# Patient Record
Sex: FEMALE | Race: WHITE | NOT HISPANIC OR LATINO | Employment: OTHER | URBAN - METROPOLITAN AREA
[De-identification: names, ages, dates, MRNs, and addresses within clinical notes are randomized per-mention and may not be internally consistent; named-entity substitution may affect disease eponyms.]

---

## 2017-01-13 ENCOUNTER — HOSPITAL ENCOUNTER (OUTPATIENT)
Dept: RADIOLOGY | Facility: HOSPITAL | Age: 52
Discharge: HOME | End: 2017-01-13
Attending: INTERNAL MEDICINE
Payer: MEDICAID

## 2017-01-13 DIAGNOSIS — E04.1 THYROID NODULE: ICD-10-CM

## 2017-01-13 PROCEDURE — 76536 US EXAM OF HEAD AND NECK: CPT | Mod: 26,,, | Performed by: RADIOLOGY

## 2017-01-13 PROCEDURE — 76536 US EXAM OF HEAD AND NECK: CPT | Mod: TC

## 2017-06-14 ENCOUNTER — OFFICE VISIT (OUTPATIENT)
Dept: NEUROLOGY | Facility: HOSPITAL | Age: 52
End: 2017-06-14
Attending: INTERNAL MEDICINE
Payer: MEDICAID

## 2017-06-14 VITALS — WEIGHT: 157 LBS | BODY MASS INDEX: 27.82 KG/M2 | HEIGHT: 63 IN

## 2017-06-14 DIAGNOSIS — Z80.0 FAMILY HISTORY OF COLON CANCER: Primary | ICD-10-CM

## 2017-06-14 PROCEDURE — 99213 OFFICE O/P EST LOW 20 MIN: CPT | Performed by: INTERNAL MEDICINE

## 2017-06-14 RX ORDER — POLYETHYLENE GLYCOL 3350, SODIUM SULFATE ANHYDROUS, SODIUM BICARBONATE, SODIUM CHLORIDE, POTASSIUM CHLORIDE 236; 22.74; 6.74; 5.86; 2.97 G/4L; G/4L; G/4L; G/4L; G/4L
4 POWDER, FOR SOLUTION ORAL ONCE
Qty: 4000 ML | Refills: 0 | Status: SHIPPED | OUTPATIENT
Start: 2017-06-14 | End: 2017-06-14

## 2017-06-14 NOTE — PATIENT INSTRUCTIONS
Nulytely Colonoscopy Prep Instructions    [facility] [place]   [address], [place] [address]  [phone]      PATIENT NAME:    Ronda Mello             PROCEDURE DAY: Thursday, July 20, 2017    *Your procedure time many change.  The hospital will contact you the day prior to   the procedure to confirm your procedure time and arrival.       CLEAR LIQUID DIET (START THE DAY BEFORE PROCEDURE): Wednesday, July 19, 2017      Clear Liquid Diet means any liquid from the list below that is not red or purple in color:   Gatorade, Hernan-Aid, Lemonade (Yellow ONLY)-Gatorade is the preferred liquid   Tea (no milk or dairy)   Carbonated beverages (soft drink), regular or diet   Apple juice, white grape juice, white cranberry juice   Jell-O (orange, lemon, or lime flavors ONLY)   Clear, fat-free, beef or chicken broth   Bouillon, clear consommé   Snowball, Popsicles (NOT red or purple)  * No Solid Food or Alcohol     ITEMS TO BE PURCHASED FOR PREP (Nu-Lytely requires a prescription):         Nu-Lytely preparation solution.          Gas tablets (Gas-X, Mylanta Gas, Simethicone)    BOWEL PREP INSTRUCTIONS THE DAY BEFORE THE EXAM: Wednesday, July 19, 2017    1. Drink only clear liquids (see the above diet) all day. Gatorade is the best liquid.   Drink an extra 8 ounces of clear liquid every hour from 11am to 5pm.         2.   At 6pm, mix Nu-Lytely powder according to the directions on the container and               drink 8 ounces of solution every 10 minutes until about half of the solution is                consumed. Place the remainder of the solution in the refrigerator.         3.   At 9pm, take two gas tablets with 8 ounces of clear liquid.        4.   At 10pm, take two gas tablets with 8 ounces of clear liquid.      THE DAY OF THE EXAM: Thursday, July 20, 2017        1.  Beginning 5 hours before your procedure time, drink the remaining half of              Nu-Lytely solution. Drink 8 ounces  of solution every 10 minutes until the solution              is gone.              *If your procedure is scheduled for the early morning, you will need to get up in               the middle of the night to take this dose of preparation. The correct timing of this               dose is essential to an effective preparation. If you do not take this dose, your               exam may be incomplete and need to be repeated.         2.  Have nothing to eat or drink for 3 hours before the procedure.         3.  Take your morning medications, if any, with a small sip of water.         4.  Bring someone to drive you home (you should not drive for 12 hrs after the exam)        5.  Report to Admitting, 1st floor hospital entrance 2 hours before procedure time.       If you are diabetic, do not take insulin or oral medications the morning of the procedure. Take only a half dose of insulin the day before your procedure. Do not take your diabetic pills the day of your procedure               Colonoscopy is used to view the inside of your lower digestive tract (colon and rectum). It can help screen for colon cancer and can also help find the source of abdominal pain, bleeding, and changes in bowel habits. The test is usually done in the hospital on an outpatient basis. During the exam, the doctor can remove a small tissue sample ( a biopsy) for testing. Small growths, such as polyps, may also be removed during colonoscopy.     A camera attached to a flexible tube with a viewing lens is used to take video pictures.    Getting Ready    Be sure to tell your doctor about any medications you take. Also tell your doctor about any health conditions you may have.   Discuss the risks of the test with your doctor. These include bleeding and bowel puncture.   Your rectum and colon must be empty for the test. So be sure to follow the diet and bowel prep instructions exactly. If you dont, the test may need to be rescheduled.   You will  need to have a friend or family member prepared to drive you home after the test.     Colonoscopy provides an inside view of the entire colon.    During the Test    You are given sedating (relaxing) medication through an IV line. You may be drowsy or completely asleep.   The procedure takes 30 minutes or longer.   The doctor performs a digital rectal exam to check for anal and rectal problems. The rectum is lubricated and the scope inserted.   If you are awake, you may have a feeling similar to needing to have a bowel movement. You may also feel pressure as air is pumped into the colon. Its okay to pass gas during the procedure.  After the Test    You may discuss the results with your doctor right away or at a future visit.   Try to pass all the gas right after the test to help prevent bloating and cramping.   After the test, you can go back to your normal eating and other activities.  Risks and Possible Complications Include:   Bleeding  A puncture or tear in the colon  Risks of anesthesia

## 2017-06-14 NOTE — PROGRESS NOTES
"[facility]     CC: Hepatitis C     HPI 51 y.o. female here for chronic, untreated, asymptomatic Hepatitis C associated with former IV drug use.  She also has a history of duodenal carcinoma s/p Whipple procedure, family history of colon cancer (reportedly her mother has Rebollar syndrome), uterine cancer s/p radiation and ANJANA, schizophrenia, and long history of alcohol abuse. Since seeing me last she reports she's feeling at her baseline but is frustrated by lack of insurance coverage for HCV therapy.       Medical history  Uterine cancer s/p radiation therapy  Duodenal carcinoma s/p Whipple  Chronic HCV, former IVDU  Schizophrenia       Review of Systems  General ROS: negative for chills, fever or weight loss  Cardiovascular ROS: no chest pain or dyspnea on exertion  Gastrointestinal ROS: no abdominal pain, change in bowel habits, or black/ bloody stools     Physical Examination  Ht 5' 3" (1.6 m)   Wt 71.2 kg (157 lb)   BMI 27.81 kg/m²   General appearance: alert, cooperative, no distress  HENT: Normocephalic, atraumatic, neck symmetrical, no nasal discharge   Lungs: clear to auscultation bilaterally, no dullness to percussion bilaterally  Heart: regular rate and rhythm without rub; no displacement of the PMI   Abdomen: soft, non-tender; bowel sounds normoactive; no organomegaly  Extremities: extremities symmetric; no clubbing, cyanosis, or edema  Neurologic: Alert and oriented X 3, normal strength, normal coordination and gait     Labs:  Hep C 3/2016  -Fibrosure, F0, A0  -GT 1a  -VL: 1.3 million copies     Assessment:   49 yo female with family history of colon cancer in her mother (age 60 who also has a history of Rebollar syndrome), h/o duodenal adenocarcinoma s/p Whipple, Hepatitis C without biochemical evidence of cirrhosis, here for evaluation of Hepatitis C treatment and liver disease.     Plan:  -Her insurance has declined to cover HCV therapy at this time  -For family history of Rebollar syndrome and " personal history of duodenal adenocarcinoma, will plan for repeat colonoscopy with close inspection then repeat in 2 years     *use a pediatric colonoscope for exam*      Abhijeet Dickey MD   [address], [address]   [place] [address] [phone]

## 2017-06-16 ENCOUNTER — TELEPHONE (OUTPATIENT)
Dept: NEUROLOGY | Facility: HOSPITAL | Age: 52
End: 2017-06-16

## 2017-06-16 NOTE — TELEPHONE ENCOUNTER
No Authorization is needed for cpt 33981  Per Eulalia FIORE  Reference # [id number] Eulalia FIORE 2:58

## 2017-07-20 ENCOUNTER — HOSPITAL ENCOUNTER (OUTPATIENT)
Facility: HOSPITAL | Age: 52
Discharge: HOME | End: 2017-07-20
Attending: INTERNAL MEDICINE | Admitting: INTERNAL MEDICINE
Payer: MEDICAID

## 2017-07-20 ENCOUNTER — ANESTHESIA EVENT (OUTPATIENT)
Dept: ENDOSCOPY | Facility: HOSPITAL | Age: 52
End: 2017-07-20
Payer: MEDICAID

## 2017-07-20 ENCOUNTER — ANESTHESIA (OUTPATIENT)
Dept: ENDOSCOPY | Facility: HOSPITAL | Age: 52
End: 2017-07-20
Payer: MEDICAID

## 2017-07-20 DIAGNOSIS — Z15.09 LYNCH SYNDROME: ICD-10-CM

## 2017-07-20 DIAGNOSIS — Z12.11 SCREENING FOR COLON CANCER: Primary | ICD-10-CM

## 2017-07-20 DIAGNOSIS — K63.5 POLYP OF COLON, UNSPECIFIED PART OF COLON, UNSPECIFIED TYPE: ICD-10-CM

## 2017-07-20 PROCEDURE — 45385 COLONOSCOPY W/LESION REMOVAL: CPT | Performed by: INTERNAL MEDICINE

## 2017-07-20 PROCEDURE — 88305 TISSUE EXAM BY PATHOLOGIST: CPT | Mod: 26,,, | Performed by: PATHOLOGY

## 2017-07-20 PROCEDURE — 63600175 PHARM REV CODE 636 W HCPCS: Performed by: NURSE ANESTHETIST, CERTIFIED REGISTERED

## 2017-07-20 PROCEDURE — 45381 COLONOSCOPY SUBMUCOUS NJX: CPT | Performed by: INTERNAL MEDICINE

## 2017-07-20 PROCEDURE — 25000003 PHARM REV CODE 250: Performed by: NURSE ANESTHETIST, CERTIFIED REGISTERED

## 2017-07-20 PROCEDURE — 27201028 HC NEEDLE, SCLERO: Performed by: INTERNAL MEDICINE

## 2017-07-20 PROCEDURE — 88305 TISSUE EXAM BY PATHOLOGIST: CPT | Performed by: PATHOLOGY

## 2017-07-20 PROCEDURE — 37000008 HC ANESTHESIA 1ST 15 MINUTES: Performed by: INTERNAL MEDICINE

## 2017-07-20 PROCEDURE — 27201089 HC SNARE, DISP (ANY): Performed by: INTERNAL MEDICINE

## 2017-07-20 PROCEDURE — 25000003 PHARM REV CODE 250: Performed by: INTERNAL MEDICINE

## 2017-07-20 PROCEDURE — 37000009 HC ANESTHESIA EA ADD 15 MINS: Performed by: INTERNAL MEDICINE

## 2017-07-20 RX ORDER — LIDOCAINE HCL/PF 100 MG/5ML
SYRINGE (ML) INTRAVENOUS
Status: DISCONTINUED | OUTPATIENT
Start: 2017-07-20 | End: 2017-07-20

## 2017-07-20 RX ORDER — PROPOFOL 10 MG/ML
VIAL (ML) INTRAVENOUS CONTINUOUS PRN
Status: DISCONTINUED | OUTPATIENT
Start: 2017-07-20 | End: 2017-07-20

## 2017-07-20 RX ORDER — PROPOFOL 10 MG/ML
VIAL (ML) INTRAVENOUS
Status: DISCONTINUED | OUTPATIENT
Start: 2017-07-20 | End: 2017-07-20

## 2017-07-20 RX ORDER — SODIUM CHLORIDE 9 MG/ML
INJECTION, SOLUTION INTRAVENOUS CONTINUOUS
Status: DISCONTINUED | OUTPATIENT
Start: 2017-07-20 | End: 2017-07-20 | Stop reason: HOSPADM

## 2017-07-20 RX ADMIN — SODIUM CHLORIDE: 0.9 INJECTION, SOLUTION INTRAVENOUS at 10:07

## 2017-07-20 RX ADMIN — LIDOCAINE HYDROCHLORIDE 40 MG: 20 INJECTION, SOLUTION INTRAVENOUS at 11:07

## 2017-07-20 RX ADMIN — PROPOFOL 50 MG: 10 INJECTION, EMULSION INTRAVENOUS at 11:07

## 2017-07-20 RX ADMIN — PROPOFOL 150 MCG/KG/MIN: 10 INJECTION, EMULSION INTRAVENOUS at 11:07

## 2017-07-20 NOTE — H&P
[facility]     CC: Hepatitis C     HPI 51 y.o. female here for chronic, untreated, asymptomatic Hepatitis C associated with former IV drug use.  She also has a history of duodenal carcinoma s/p Whipple procedure, family history of colon cancer (reportedly her mother has Rebollar syndrome), uterine cancer s/p radiation and ANJANA, schizophrenia, and long history of alcohol abuse.   Since seeing me last she reports she's feeling at her baseline but is frustrated by lack of insurance coverage for HCV therapy.       Medical history  Uterine cancer s/p radiation therapy  Duodenal carcinoma s/p Whipple  Chronic HCV, former IVDU  Schizophrenia       Review of Systems  General ROS: negative for chills, fever or weight loss  Cardiovascular ROS: no chest pain or dyspnea on exertion  Gastrointestinal ROS: no abdominal pain, change in bowel habits, or black/ bloody stools     Physical Examination  Vitals reviewed  General appearance: alert, cooperative, no distress  HENT: Normocephalic, atraumatic, neck symmetrical, no nasal discharge   Lungs: clear to auscultation bilaterally, no dullness to percussion bilaterally  Heart: regular rate and rhythm without rub; no displacement of the PMI   Abdomen: soft, non-tender; bowel sounds normoactive; no organomegaly  Extremities: extremities symmetric; no clubbing, cyanosis, or edema  Neurologic: Alert and oriented X 3, normal strength, normal coordination and gait     Labs:  Hep C 3/2016  -Fibrosure, F0, A0  -GT 1a  -VL: 1.3 million copies    Lab Results   Component Value Date    WBC 13.76 (H) 07/12/2016    HGB 12.7 07/12/2016    HCT 38.1 07/12/2016    MCV 89 07/12/2016     07/12/2016          Assessment:   51 yo female with family history of colon cancer in her mother (age 60 who also has a history of Rebollar syndrome), h/o duodenal adenocarcinoma s/p Whipple, Hepatitis C without biochemical evidence of cirrhosis, here for colonoscopy for family history of Rebollar syndrome and  personal history of duodenal AC.     Plan:  -For family history of Rebollar syndrome and personal history of duodenal adenocarcinoma, colonoscopy today with close inspection then repeat in 2 years      *use a pediatric colonoscope for exam*      Abhijeet Dickey MD   [address], [address]   [place] [address] [phone]

## 2017-07-20 NOTE — ANESTHESIA POSTPROCEDURE EVALUATION
"Anesthesia Post Evaluation    Patient: Ronda Mello    Procedure(s) Performed: Procedure(s) (LRB):  COLONOSCOPY (N/A)    Final Anesthesia Type: MAC  Patient location during evaluation: GI PACU  Patient participation: Yes- Able to Participate  Level of consciousness: awake and alert  Post-procedure vital signs: reviewed and stable  Pain management: adequate  Airway patency: patent  PONV status at discharge: No PONV  Anesthetic complications: no      Cardiovascular status: hemodynamically stable  Respiratory status: unassisted, spontaneous ventilation and room air  Hydration status: euvolemic  Follow-up not needed.        Visit Vitals  BP (!) 163/86 (BP Method: Automatic)   Pulse 76   Temp 36.8 °C (98.2 °F) (Oral)   Resp 20   Ht 5' 3" (1.6 m)   Wt 72.1 kg (159 lb)   SpO2 98%   Breastfeeding? No   BMI 28.17 kg/m²       Pain/Brandie Score: Pain Assessment Performed: Yes (7/20/2017 10:39 AM)  Presence of Pain: denies (7/20/2017 10:39 AM)      "

## 2017-07-20 NOTE — TRANSFER OF CARE
"Anesthesia Transfer of Care Note    Patient: Ronda Mello    Procedure(s) Performed: Procedure(s) (LRB):  COLONOSCOPY (N/A)    Patient location: GI    Anesthesia Type: MAC    Transport from OR: Transported from OR on room air with adequate spontaneous ventilation    Post pain: adequate analgesia    Post assessment: no apparent anesthetic complications    Post vital signs: stable    Level of consciousness: awake and alert    Nausea/Vomiting: no nausea/vomiting    Complications: none    Transfer of care protocol was followed      Last vitals:   Visit Vitals  BP (!) 163/86 (BP Method: Automatic)   Pulse 76   Temp 36.8 °C (98.2 °F) (Oral)   Resp 20   Ht 5' 3" (1.6 m)   Wt 72.1 kg (159 lb)   SpO2 98%   Breastfeeding? No   BMI 28.17 kg/m²     "

## 2017-07-20 NOTE — DISCHARGE INSTRUCTIONS
Discharge Summary/Instructions for after Colonoscopy with Biopsy/Polypectomy    Ronda Mello  7/20/2017  Abhijeet Dickey MD    Restrictions on Activity:    - Do not drive car or operate machinery until the day after the procedure.  - The following day: return to full activity including work.  - For 3 days: No heavy lifting, straining or running.  - Diet: Eat and drink normally unless instructed otherwise.    Treatment for Common Side Effects:  - Mild abdominal pain and bloating or excessive gas: rest, eat lightly and use a heating pad.     Symptoms to watch for and report to your physician:  1. Severe abdominal pain.  2. Fever within 24 hours after a procedure.  3. A large amount of rectal bleeding. (A small amount of blood from the rectum is not serious, especially if hemorrhoids are present.)  4. Because air was put into your colon during the procedure, expelling large amount of air from your rectum is normal.  5. You may not have a bowel movement for 1-3 days because of the colonoscopy prep. This is normal.  6. Go directly to the emergency room if you notice any of the following:     Chills and/or fever over 101   Persistent vomiting   Severe abdominal pain, other than gas cramps   Severe chest pain   Black, tarry stools   Any bleeding - exceeding one tablespoon    If you have any questions or problems, please call your Physician:    Abhijeet Dickey MD      Lab Results: Contact Physician's Office      If a complication or emergency situation arises and you are unable to reach your Physician - GO TO THE EMERGENCY ROOM.

## 2017-07-20 NOTE — ANESTHESIA PREPROCEDURE EVALUATION
07/20/2017  Ronda Mello is a 52 y.o., female w hx colon CA for colonoscopy.    PRIOR ANES (in Epic)   2015-10-15 Colonoscopy MAC prop boluses (140 total)    infusion 150 mcg/kg/min VSS [facility]    ANES-RELATED MED/SURG  Smoker  Past Surgical History:   Procedure Laterality Date    CHOLECYSTECTOMY      COLONOSCOPY N/A 10/15/2015    Procedure: COLONOSCOPY;  Surgeon: Abhijeet Dickey MD;  Location: [facility];  Service: Endoscopy;  Laterality: N/A;    HYSTERECTOMY      total w/ removal of both ovaries    UMBILICAL HERNIA REPAIR       ALLERGIES  Review of patient's allergies indicates:   Allergen Reactions    Norco [hydrocodone-acetaminophen] Hives     ANES-RELATED HOME Rx  Advair    Anesthesia Evaluation         Review of Systems  Anesthesia Hx:  Denies Hx of Anesthetic complications History of prior surgery of interest to airway management or planning:  Denies Personal Hx of Anesthesia complications.   Social:  Smoker, Social Alcohol Use    Hematology/Oncology:  Hematology Normal   Oncology Normal     EENT/Dental:   Missing teeth  No loose teeth   Cardiovascular:  Cardiovascular Normal  Denies Hypertension.     Pulmonary:   COPD Denies Sleep Apnea.    Renal/:  Renal/ Normal     Hepatic/GI:   Hx colon CA   Musculoskeletal:   Arthritis     Neurological:  Neurology Normal    Endocrine:  Endocrine Normal      Wt Readings from Last 1 Encounters:   07/20/17 72.1 kg (159 lb)     Temp Readings from Last 1 Encounters:   07/20/17 36.8 °C (98.2 °F) (Oral)     BP Readings from Last 1 Encounters:   07/20/17 (!) 163/86     Pulse Readings from Last 1 Encounters:   07/20/17 76     SpO2 Readings from Last 1 Encounters:   07/20/17 98%       Physical Exam  General:  Well nourished    Airway/Jaw/Neck:  AIRWAY FINDINGS: Normal       Dental:  Dental Findings:    Chest/Lungs:  Chest/Lungs Findings: Rhonchi         Mental  Status:  Mental Status Findings: Normal       Lab Results   Component Value Date    WBC 13.76 (H) 07/12/2016    HGB 12.7 07/12/2016    HCT 38.1 07/12/2016    MCV 89 07/12/2016     07/12/2016       Chemistry        Component Value Date/Time     07/12/2016 1612    K 3.6 07/12/2016 1612     07/12/2016 1612    CO2 23 07/12/2016 1612    BUN 11 07/12/2016 1612    CREATININE 0.5 07/12/2016 1612     07/12/2016 1612        Component Value Date/Time    CALCIUM 8.4 (L) 07/12/2016 1612    ALKPHOS 57 07/12/2016 1612    AST 21 07/12/2016 1612    ALT 21 07/12/2016 1612    BILITOT 0.8 07/12/2016 1612    ESTGFRAFRICA >60 07/12/2016 1612    EGFRNONAA >60 07/12/2016 1612          Lab Results   Component Value Date    ALBUMIN 2.9 (L) 07/12/2016     Lab Results   Component Value Date    TSH <0.010 (L) 01/30/2016       CXR 2016-03-04  The lungs are clear of any focal opacity.  There is no pneumothorax or pleural fluid.  The cardiac silhouette is not enlarged.  The osseous structures are significant for a remote fracture deformity of the left sixth rib.    EKG 2016-01-30  Normal sinus rhythm  Possible Biatrial abnormality  No previous ECGs available  Confirmed by RICH    ECHO      Anesthesia Plan  Type of Anesthesia, risks & benefits discussed:  Anesthesia Type:  MAC  Patient's Preference:   Intra-op Monitoring Plan:   Intra-op Monitoring Plan Comments:   Post Op Pain Control Plan:   Post Op Pain Control Plan Comments:   Induction:    Beta Blocker:  Patient is not currently on a Beta-Blocker (No further documentation required).       Informed Consent: Patient understands risks and agrees with Anesthesia plan.  Questions answered. Anesthesia consent signed with patient.  ASA Score: 2     Day of Surgery Review of History & Physical:            Ready For Surgery From Anesthesia Perspective.

## 2017-07-21 VITALS
WEIGHT: 159 LBS | BODY MASS INDEX: 28.17 KG/M2 | OXYGEN SATURATION: 100 % | DIASTOLIC BLOOD PRESSURE: 99 MMHG | HEART RATE: 86 BPM | HEIGHT: 63 IN | SYSTOLIC BLOOD PRESSURE: 163 MMHG | RESPIRATION RATE: 15 BRPM | TEMPERATURE: 98 F

## 2017-07-31 ENCOUNTER — TELEPHONE (OUTPATIENT)
Dept: NEUROLOGY | Facility: HOSPITAL | Age: 52
End: 2017-07-31

## 2017-07-31 NOTE — TELEPHONE ENCOUNTER
Patient informed of pathology with plan to repeat colonoscopy in two years given history of Rebollar syndrome.

## 2017-08-04 ENCOUNTER — HOSPITAL ENCOUNTER (EMERGENCY)
Facility: HOSPITAL | Age: 52
Discharge: HOME | End: 2017-08-05
Attending: EMERGENCY MEDICINE
Payer: MEDICAID

## 2017-08-04 DIAGNOSIS — F10.10 ALCOHOL ABUSE: Primary | ICD-10-CM

## 2017-08-04 PROCEDURE — 96374 THER/PROPH/DIAG INJ IV PUSH: CPT

## 2017-08-04 PROCEDURE — 99284 EMERGENCY DEPT VISIT MOD MDM: CPT | Mod: 25

## 2017-08-04 PROCEDURE — 96375 TX/PRO/DX INJ NEW DRUG ADDON: CPT

## 2017-08-04 PROCEDURE — 99285 EMERGENCY DEPT VISIT HI MDM: CPT | Mod: ,,, | Performed by: EMERGENCY MEDICINE

## 2017-08-04 PROCEDURE — 96372 THER/PROPH/DIAG INJ SC/IM: CPT

## 2017-08-05 VITALS
OXYGEN SATURATION: 95 % | TEMPERATURE: 100 F | HEART RATE: 79 BPM | DIASTOLIC BLOOD PRESSURE: 75 MMHG | RESPIRATION RATE: 18 BRPM | WEIGHT: 159 LBS | HEIGHT: 63 IN | BODY MASS INDEX: 28.17 KG/M2 | SYSTOLIC BLOOD PRESSURE: 134 MMHG

## 2017-08-05 LAB — ETHANOL SERPL-MCNC: 291 MG/DL

## 2017-08-05 PROCEDURE — 25000003 PHARM REV CODE 250: Performed by: EMERGENCY MEDICINE

## 2017-08-05 PROCEDURE — 93010 ELECTROCARDIOGRAM REPORT: CPT | Mod: ,,, | Performed by: INTERNAL MEDICINE

## 2017-08-05 PROCEDURE — 63600175 PHARM REV CODE 636 W HCPCS: Performed by: EMERGENCY MEDICINE

## 2017-08-05 PROCEDURE — 80320 DRUG SCREEN QUANTALCOHOLS: CPT

## 2017-08-05 RX ORDER — DIPHENHYDRAMINE HYDROCHLORIDE 50 MG/ML
25 INJECTION INTRAMUSCULAR; INTRAVENOUS
Status: COMPLETED | OUTPATIENT
Start: 2017-08-05 | End: 2017-08-05

## 2017-08-05 RX ORDER — HALOPERIDOL 5 MG/ML
5 INJECTION INTRAMUSCULAR
Status: COMPLETED | OUTPATIENT
Start: 2017-08-05 | End: 2017-08-05

## 2017-08-05 RX ORDER — LORAZEPAM 2 MG/ML
2 INJECTION INTRAMUSCULAR
Status: COMPLETED | OUTPATIENT
Start: 2017-08-05 | End: 2017-08-05

## 2017-08-05 RX ADMIN — DIPHENHYDRAMINE HYDROCHLORIDE 25 MG: 50 INJECTION INTRAMUSCULAR; INTRAVENOUS at 12:08

## 2017-08-05 RX ADMIN — HALOPERIDOL LACTATE 5 MG: 5 INJECTION, SOLUTION INTRAMUSCULAR at 12:08

## 2017-08-05 RX ADMIN — FOLIC ACID: 5 INJECTION, SOLUTION INTRAMUSCULAR; INTRAVENOUS; SUBCUTANEOUS at 12:08

## 2017-08-05 RX ADMIN — LORAZEPAM 2 MG: 2 INJECTION INTRAMUSCULAR; INTRAVENOUS at 12:08

## 2017-08-05 NOTE — ED NOTES
Pt is resting comfortably in stretcher with eyes closed. Respirations are full, even, and non labored. NAD noted. Pt denies pain at this time. Call bell is in reach, side rails are up x 2, and bed is in lowest, locked, position. Will continue to monitor.

## 2017-08-05 NOTE — ED NOTES
Patient identifiers verified and correct for Ms Mello  C/C: Alcohol intoxication  APPEARANCE: awake and alert in NAD.  SKIN: warm, dry and intact. No breakdown or bruising.  MUSCULOSKELETAL: Patient moving all extremities spontaneously, no obvious swelling or deformities noted. Ambulates independently.  RESPIRATORY: Denies shortness of breath.Respirations unlabored.   CARDIAC: Denies CP, 2+ distal pulses; no peripheral edema  ABDOMEN: S/ND/NT, Denies nausea  : voids spontaneously, denies difficulty  Neurologic: AAO x 4; follows commands equal strength in all extremities; denies numbness/tingling. Denies dizziness

## 2017-08-05 NOTE — ED NOTES
"Care assumed, pericare and linen changed for loose BM. Patient awake, alert, states she will take the bus home, states she drank last night "first time in 7 months" as last night was anniversary of Mothers death.   "

## 2017-08-05 NOTE — ED NOTES
Spoke with CN and physician, called [phone], patient states address [address]

## 2017-08-05 NOTE — ED TRIAGE NOTES
According to EMS, pt was found passed out intoxicated on the street. Police did not take her to long-term because they didn't want to deal with her. Patient is loud, screaming obscenities, smells of ETOH and appears intoxicated

## 2017-08-05 NOTE — ED NOTES
Pt awakens to verbal stimuli Resp full and reg Radial pulses strong and reg Oriented to person,time and people Reoriented to place

## 2017-08-05 NOTE — ED NOTES
Pt is resting comfortably on a stretcher. NAD noted, VSS. BP cuff and pulse ox alarms are set. Bed low and locked, side rails up x2. Call light within reach of pt. Will continue to monitor.

## 2017-08-05 NOTE — ED NOTES
Pt is AAOx3. Pt is comfortable. Behavior is loud and inappropriate. Patient smells of ETOH and appears intoxicated. Skin is warm, dry, and intact. Mucous membranes are pink and moist. Airway is patent.  Respirations are full, even, and non labored. Breath sounds are present and clear throughout all lung fields.  Abdomen is soft, flat, non distended, and non tender x 4 quads. Normoactive bowel sounds present x 4 quadrants. Pulses are palpable in bilateral radial arteries. Capillary refill is brisk and  < 3 seconds in bilateral fingers. S1, S2 heart tones are present. No neuro deficits noted. Pt's identity confirmed and armband applied. Pt updated on plan of care. Pt awaiting ED physician. No needs verbalized. Pain at this time is 0/10.  Will continue to monitor.

## 2017-08-05 NOTE — ED NOTES
Pt resting quietly in stretcher with eyes closed, sitter at bedside. NAD noted. Respirations even, and nonlabored. Will continue to monitor. Side rails are up x 2 and stretcher is in lowest, locked position.

## 2017-08-05 NOTE — ED PROVIDER NOTES
Encounter Date: 8/4/2017    SCRIBE #1 NOTE: I, Raffaele Swann, am scribing for, and in the presence of,  Dr. Jimenez . I have scribed the entire note.       History     Chief Complaint   Patient presents with    Alcohol Intoxication     Found very drunk down the street.      Time patient was seen by the provider: 12:10 AM      Pt with a hx of Schizophrenia presents with EtOH intoxication and was brought into the ED by ambulance. She is belligerent, agitated, and threatening in the ambulance. Pt states she does not drink often but tonight she did. She has a drug allergy to Norco.         The history is provided by the patient and medical records.     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     Uterine cancer     adenocarcinoma ot uterus     Past Surgical History:   Procedure Laterality Date    CHOLECYSTECTOMY      COLONOSCOPY N/A 10/15/2015    Procedure: COLONOSCOPY;  Surgeon: Abhijeet Dickey MD;  Location: [facility] ENDO;  Service: Endoscopy;  Laterality: N/A;    COLONOSCOPY N/A 7/20/2017    Procedure: COLONOSCOPY;  Surgeon: Abhijeet Dickey MD;  Location: [facility] ENDO;  Service: Endoscopy;  Laterality: N/A;    HYSTERECTOMY      total w/ removal of both ovaries    UMBILICAL HERNIA REPAIR       Family History   Problem Relation Age of Onset    Cancer      Arthritis      Heart disease       Social History   Substance Use Topics    Smoking status: Current Every Day Smoker     Packs/day: 0.25     Years:  "37.00     Types: Cigarettes     Last attempt to quit: 12/9/2015    Smokeless tobacco: Never Used    Alcohol use 0.0 oz/week      Comment: "i drink beer every other weekend but that's it"     Review of Systems   Unable to perform ROS: Mental status change       Physical Exam     Initial Vitals [08/04/17 2353]   BP Pulse Resp Temp SpO2   (!) 140/90 90 16 -- 100 %      MAP       106.67         Physical Exam    Nursing note and vitals reviewed.  Constitutional: She appears well-developed and well-nourished. She is not diaphoretic. No distress.   HENT:   Head: Normocephalic and atraumatic.   Mouth/Throat: Oropharynx is clear and moist.   Eyes: Conjunctivae and EOM are normal. Pupils are equal, round, and reactive to light.   Neck: Normal range of motion. Neck supple. No JVD present.   Cardiovascular: Normal rate, regular rhythm and normal heart sounds.   No murmur heard.  Pulmonary/Chest: Breath sounds normal. No respiratory distress. She has no wheezes. She has no rhonchi. She has no rales.   Abdominal: Soft. Bowel sounds are normal. She exhibits no mass. There is no tenderness. There is no rebound and no guarding.   Genitourinary:   Genitourinary Comments: Pelvic exam:  Slight amount of bleeding. Os closed. No tenderness.    Musculoskeletal: Normal range of motion. She exhibits no edema or tenderness.   Neurological: She is alert and oriented to person, place, and time. She has normal strength. No cranial nerve deficit or sensory deficit.   Neuro intact.    Skin: Skin is warm and dry. No rash noted.   Psychiatric:   Pt is cursing, violent, punching, and aggressive.          ED Course   Procedures  Labs Reviewed   ALCOHOL,MEDICAL (ETHANOL) - Abnormal; Notable for the following:        Result Value    Alcohol, Medical, Serum 291 (*)     All other components within normal limits     EKG Readings: (Independently Interpreted)   Sinus tachycardia at rate of 110 bpm. No ischemic changes.           Medical Decision Making: "   History:   Old Medical Records: I decided to obtain old medical records.  Initial Assessment:   Violent pt. Will sedate with Haldol and Ativan and observe closely.   Independently Interpreted Test(s):   I have ordered and independently interpreted EKG Reading(s) - see prior notes  Clinical Tests:   Lab Tests: Ordered and Reviewed  Medical Tests: Ordered and Reviewed            Scribe Attestation:   Scribe #1: I performed the above scribed service and the documentation accurately describes the services I performed. I attest to the accuracy of the note.    Attending Attestation:           Physician Attestation for Scribe:  Physician Attestation Statement for Scribe #1: I, Dr. Jimenez , reviewed documentation, as scribed by Raffaele Swann in my presence, and it is both accurate and complete.                 ED Course     Clinical Impression:   The encounter diagnosis was Alcohol abuse.    Disposition:   Disposition: Discharged  Condition: Stable                      Catrachito Jimenez MD  08/06/17 0532

## 2017-08-05 NOTE — ED NOTES
Update to Dr Trujillo, states he would like patient to go home via taxi, update to case management,

## 2017-08-05 NOTE — ED NOTES
Pt resting quietly in stretcher with eyes closed, sitter at bedside. NAD noted. Respirations even, and nonlabored. Will continue to monitor

## 2017-08-14 DIAGNOSIS — E05.90 HYPERTHYROIDISM, SUBCLINICAL: Primary | ICD-10-CM

## 2017-09-10 ENCOUNTER — HOSPITAL ENCOUNTER (EMERGENCY)
Facility: HOSPITAL | Age: 52
Discharge: HOME | End: 2017-09-10
Attending: EMERGENCY MEDICINE
Payer: MEDICAID

## 2017-09-10 VITALS
HEIGHT: 63 IN | BODY MASS INDEX: 28.7 KG/M2 | HEART RATE: 102 BPM | RESPIRATION RATE: 20 BRPM | TEMPERATURE: 99 F | DIASTOLIC BLOOD PRESSURE: 70 MMHG | WEIGHT: 162 LBS | SYSTOLIC BLOOD PRESSURE: 144 MMHG | OXYGEN SATURATION: 97 %
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
## 2017-09-10 DIAGNOSIS — R07.81 RIB PAIN ON LEFT SIDE: ICD-10-CM
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
## 2017-09-10 DIAGNOSIS — S22.32XA CLOSED FRACTURE OF ONE RIB OF LEFT SIDE, INITIAL ENCOUNTER: Primary | ICD-10-CM

## 2017-09-10 PROCEDURE — 25000003 PHARM REV CODE 250: Performed by: GENERAL PRACTICE

## 2017-09-10 PROCEDURE — 99284 EMERGENCY DEPT VISIT MOD MDM: CPT | Mod: ,,, | Performed by: EMERGENCY MEDICINE

## 2017-09-10 PROCEDURE — 99283 EMERGENCY DEPT VISIT LOW MDM: CPT

## 2017-09-10 RX ORDER — MELOXICAM 7.5 MG/1
7.5 TABLET ORAL DAILY
Qty: 14 TABLET | Refills: 0 | Status: SHIPPED | OUTPATIENT
Start: 2017-09-10 | End: 2019-04-01 | Stop reason: CLARIF

## 2017-09-10 RX ORDER — IBUPROFEN 400 MG/1
800 TABLET ORAL
Status: COMPLETED | OUTPATIENT
Start: 2017-09-10 | End: 2017-09-10

## 2017-09-10 RX ADMIN — IBUPROFEN 800 MG: 400 TABLET, FILM COATED ORAL at 02:09

## 2017-09-10 NOTE — ED NOTES
LOC: The patient is awake, alert and aware of environment, the patient is oriented x 3 with rambling, slurred speech due to alcohol use.  APPEARANCE: Patient resting comfortably and in no acute distress, patient is clean and well groomed, patient's clothing is properly fastened.  SKIN: The skin is warm and dry, patient has normal skin turgor and moist mucus membranes. Scabbed abrasions noted to bilateral knees  MUSKULOSKELETAL: Patient moving all extremities well, no obvious swelling or deformities noted.  RESPIRATORY: Airway is open and patent, respirations are spontaneous, patient has a normal effort and rate.   NEURO: No neuro deficits, speech is slurred due to alcohol use.

## 2017-09-10 NOTE — ED TRIAGE NOTES
Pt to ER with c/o left rib pain after a fall Wednesday night. Pt reports she tripped and fell. Denies head injury but reports she passed out after she fell. Pt reports she was drinking when she fell but denies intoxication. Pt reports it happened at this bus stop in front of the hospital but she felt she was too intoxicated to come in for treatment. Pt reports drinking two beers today, slurred speech noted.

## 2017-09-10 NOTE — ED PROVIDER NOTES
Encounter Date: 9/10/2017    SCRIBE #1 NOTE: I, Antoni Hicks, am scribing for, and in the presence of,  Dr. Rubin. I have scribed the following portions of the note - the Resident attestation.       History     Chief Complaint   Patient presents with    Rib Injury     pt states she fell a few days ago and hurt her left lower rib. pt admits to drinking alcohol before coming into ED     HPI  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 Pt states she fell while standing on a curb on Wednesday. Pt has multiple presentations with alcohol intoxication, states she was intoxicated when she fell. Pt did now have CW pain until two days ago and it has become worse. Pt denies FCS. No NVD. No BLE swelling. Pt is a daily drinker and states she had two beers today.     Past Surgical History:   Procedure Laterality Date    CHOLECYSTECTOMY      COLONOSCOPY N/A 10/15/2015    Procedure: COLONOSCOPY;  Surgeon: Abhijeet Dickey MD;  Location: [facility] ENDO;  Service: Endoscopy;  Laterality: N/A;    COLONOSCOPY N/A 7/20/2017    Procedure: COLONOSCOPY;  Surgeon: Abhijeet Dickey MD;  Location: [facility] ENDO;  Service: Endoscopy;  Laterality: N/A;    HYSTERECTOMY      total w/ removal of both ovaries    UMBILICAL HERNIA REPAIR       Family History   Problem Relation Age of Onset    Cancer       "Arthritis      Heart disease       Social History   Substance Use Topics    Smoking status: Current Every Day Smoker     Packs/day: 1.00     Years: 35.00     Types: Cigarettes     Last attempt to quit: 12/9/2015    Smokeless tobacco: Never Used    Alcohol use 0.0 oz/week      Comment: Admits to at least 2 beers daily but states "I am not an alcoholic"     Review of Systems   Constitutional: Negative for activity change, appetite change, chills, diaphoresis, fatigue and fever.   HENT: Negative for congestion, postnasal drip, rhinorrhea, sinus pain, sinus pressure, tinnitus and voice change.    Eyes: Negative for visual disturbance.   Respiratory: Negative for cough, choking, chest tightness, shortness of breath, wheezing and stridor.    Cardiovascular: Positive for chest pain. Negative for palpitations and leg swelling.   Gastrointestinal: Negative for abdominal pain, constipation, diarrhea, nausea and rectal pain.   Genitourinary: Negative for difficulty urinating and dysuria.   Musculoskeletal: Negative for back pain and gait problem.   Skin: Negative for color change, pallor, rash and wound.   Neurological: Negative for dizziness, tremors, seizures, syncope, facial asymmetry, speech difficulty, weakness, light-headedness, numbness and headaches.   Psychiatric/Behavioral: Negative for confusion.       Physical Exam     Initial Vitals [09/10/17 0117]   BP Pulse Resp Temp SpO2   (!) 144/70 102 20 98.7 °F (37.1 °C) 97 %      MAP       94.67         Physical Exam    Nursing note and vitals reviewed.  Constitutional: She is not intubated.   Pulmonary/Chest: No accessory muscle usage. No apnea, no tachypnea and no bradypnea. She is not intubated. No respiratory distress. Chest wall is not dull to percussion. She exhibits tenderness and bony tenderness. She exhibits no mass, no laceration, no crepitus, no edema, no deformity, no swelling and no retraction.       Neurological: She is oriented to person, place, and " time. No cranial nerve deficit or sensory deficit. Gait normal. GCS eye subscore is 4. GCS verbal subscore is 5. GCS motor subscore is 6.   Psychiatric: Her speech is delayed. Her speech is not slurred. She is slowed and withdrawn. She is not combative. Cognition and memory are not impaired. She expresses no homicidal and no suicidal ideation.         ED Course   Procedures  Labs Reviewed - No data to display          Medical Decision Making:   History:   Old Medical Records: I decided to obtain old medical records.  Initial Assessment:   51yo female with h/o alcohol abuse presents with left CW pain  Differential Diagnosis:   Sprain, strain, contusion, fx, less likely to be ACS, PNA  Clinical Tests:   Radiological Study: Ordered and Reviewed  ED Management:  Pt with left lateral 6th rib fx. Pt walking without difficulty, no nystagmus. Will discharge to home with NSAIDS.  Ignacio Cannon MD, MPH  Emergency Med/Pediatrics PGY-4  9/10/2017 2:59 AM              Scribe Attestation:   Scribe #1: I performed the above scribed service and the documentation accurately describes the services I performed. I attest to the accuracy of the note.    Attending Attestation:   Physician Attestation Statement for Resident:  As the supervising MD   Physician Attestation Statement: I have personally seen and examined this patient.   I agree with the above history. -:   As the supervising MD I agree with the above PE.    As the supervising MD I agree with the above treatment, course, plan, and disposition.   -: 52 y.o. female who fell on her left side a few days ago, complaining of worsening rib pain. X-ray shows healing fracture. Will discharge with non-narcotic pain medication.   I have reviewed and agree with the residents interpretation of the following: x-rays.  I have reviewed the following: old records at this facility.          Physician Attestation for Scribe:  Physician Attestation Statement for Scribe #1: I, Dr. Rubin, reviewed  documentation, as scribed by Antoni Hicks in my presence, and it is both accurate and complete.                 ED Course      Clinical Impression:   The primary encounter diagnosis was Closed fracture of one rib of left side, initial encounter. A diagnosis of Rib pain on left side was also pertinent to this visit.                           Jb Cannon MD  Resident  09/11/17 0145

## 2017-09-13 ENCOUNTER — HOSPITAL ENCOUNTER (OUTPATIENT)
Dept: RADIOLOGY | Facility: HOSPITAL | Age: 52
Discharge: HOME | End: 2017-09-13
Attending: INTERNAL MEDICINE
Payer: MEDICAID

## 2017-09-13 DIAGNOSIS — E05.90 HYPERTHYROIDISM, SUBCLINICAL: ICD-10-CM

## 2017-09-13 PROCEDURE — 78014 THYROID IMAGING W/BLOOD FLOW: CPT | Mod: 26,,, | Performed by: RADIOLOGY

## 2017-09-14 ENCOUNTER — HOSPITAL ENCOUNTER (OUTPATIENT)
Dept: RADIOLOGY | Facility: HOSPITAL | Age: 52
Discharge: HOME | End: 2017-09-14
Attending: INTERNAL MEDICINE
Payer: MEDICAID

## 2017-09-14 PROCEDURE — 78014 THYROID IMAGING W/BLOOD FLOW: CPT | Mod: TC

## 2017-09-14 PROCEDURE — A9512 TC99M PERTECHNETATE: HCPCS

## 2017-09-26 ENCOUNTER — HOSPITAL ENCOUNTER (EMERGENCY)
Facility: HOSPITAL | Age: 52
Discharge: HOME | End: 2017-09-26
Attending: EMERGENCY MEDICINE
Payer: MEDICAID

## 2017-09-26 VITALS
DIASTOLIC BLOOD PRESSURE: 77 MMHG | BODY MASS INDEX: 28.34 KG/M2 | HEART RATE: 94 BPM | TEMPERATURE: 100 F | WEIGHT: 160 LBS | SYSTOLIC BLOOD PRESSURE: 129 MMHG | RESPIRATION RATE: 19 BRPM | OXYGEN SATURATION: 99 %

## 2017-09-26 DIAGNOSIS — M79.603 ARM PAIN: ICD-10-CM

## 2017-09-26 DIAGNOSIS — M79.622 LEFT UPPER ARM PAIN: Primary | ICD-10-CM

## 2017-09-26 LAB — TROPONIN I SERPL DL<=0.01 NG/ML-MCNC: <0.006 NG/ML

## 2017-09-26 PROCEDURE — 84484 ASSAY OF TROPONIN QUANT: CPT

## 2017-09-26 PROCEDURE — 99284 EMERGENCY DEPT VISIT MOD MDM: CPT | Mod: 25

## 2017-09-26 PROCEDURE — 99284 EMERGENCY DEPT VISIT MOD MDM: CPT | Mod: ,,, | Performed by: EMERGENCY MEDICINE

## 2017-09-26 PROCEDURE — 93005 ELECTROCARDIOGRAM TRACING: CPT

## 2017-09-26 PROCEDURE — 63600175 PHARM REV CODE 636 W HCPCS: Performed by: STUDENT IN AN ORGANIZED HEALTH CARE EDUCATION/TRAINING PROGRAM

## 2017-09-26 PROCEDURE — 96374 THER/PROPH/DIAG INJ IV PUSH: CPT

## 2017-09-26 PROCEDURE — 93010 ELECTROCARDIOGRAM REPORT: CPT | Mod: ,,, | Performed by: INTERNAL MEDICINE

## 2017-09-26 RX ORDER — NAPROXEN 500 MG/1
500 TABLET ORAL 2 TIMES DAILY
COMMUNITY
End: 2021-01-14

## 2017-09-26 RX ORDER — KETOROLAC TROMETHAMINE 30 MG/ML
10 INJECTION, SOLUTION INTRAMUSCULAR; INTRAVENOUS
Status: COMPLETED | OUTPATIENT
Start: 2017-09-26 | End: 2017-09-26

## 2017-09-26 RX ORDER — CYCLOBENZAPRINE HCL 10 MG
10 TABLET ORAL 3 TIMES DAILY PRN
COMMUNITY
End: 2021-01-14

## 2017-09-26 RX ADMIN — KETOROLAC TROMETHAMINE 10 MG: 30 INJECTION, SOLUTION INTRAMUSCULAR at 02:09

## 2017-09-26 NOTE — ED PROVIDER NOTES
"Encounter Date: 9/26/2017       History     Chief Complaint   Patient presents with    Arm Pain     left arm pain X 2 days; chest pain     52 year old AAF with history of closed left rib fracture on 9/10/2017 presenting with two days of unabated left biceps pain. The patient stated that the pain is worsened with movement of the left upper extremity from a raised to a neutral position. She stated that he pain feels like "shocks" going from the left bicep to the anterior surface of the left elbow. The pain is also worse with palpation of the left biceps. The patient stated that her daughter suffers from muscle spasms and that she has been using her cyclobenzaprine to no avail. She also tried naproxen which did not help. The patient reported that she had a 5-minute episode of chest pain 2 days ago at rest that resolved on its own. She reported no prior cardiac history. She currently denies chest pain, shortness of breath, diaphoresis, nausea/vomiting, and fevers/chills.           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     Thyroid disease     Uterine cancer     adenocarcinoma ot uterus     Past Surgical History:   Procedure Laterality Date    CHOLECYSTECTOMY      COLONOSCOPY N/A 10/15/2015    Procedure: COLONOSCOPY;  Surgeon: Abhijeet Dickey MD;  Location: [facility];  Service: Endoscopy;  Laterality: N/A;    COLONOSCOPY N/A 7/20/2017    Procedure: " "COLONOSCOPY;  Surgeon: Abhijeet Dickey MD;  Location: [facility];  Service: Endoscopy;  Laterality: N/A;    HYSTERECTOMY      total w/ removal of both ovaries    UMBILICAL HERNIA REPAIR       Family History   Problem Relation Age of Onset    Cancer      Arthritis      Heart disease       Social History   Substance Use Topics    Smoking status: Current Every Day Smoker     Packs/day: 1.00     Years: 35.00     Types: Cigarettes     Last attempt to quit: 12/9/2015    Smokeless tobacco: Never Used    Alcohol use 0.0 oz/week      Comment: Admits to at least 2 beers daily but states "I am not an alcoholic"     Review of Systems   Constitutional: Negative for activity change, chills and fever.   HENT: Negative for congestion.    Eyes: Negative for visual disturbance.   Respiratory: Negative for apnea and shortness of breath.    Cardiovascular: Negative for chest pain and palpitations.   Gastrointestinal: Negative for abdominal pain.   Endocrine: Negative for polyuria.   Genitourinary: Negative for difficulty urinating.   Musculoskeletal: Positive for myalgias. Negative for arthralgias and joint swelling.   Neurological: Negative for dizziness, weakness and light-headedness.   Psychiatric/Behavioral: Negative for agitation, behavioral problems, confusion and decreased concentration.       Physical Exam     Initial Vitals [09/26/17 1256]   BP Pulse Resp Temp SpO2   (!) 142/74 (!) 126 (!) 21 99.9 °F (37.7 °C) 96 %      MAP       96.67         Physical Exam    Nursing note and vitals reviewed.  Constitutional: She appears well-developed and well-nourished. She is not diaphoretic. No distress.   Disheveled appearing 52 year old woman with several missing teeth on the right side.   HENT:   Head: Normocephalic and atraumatic.   Eyes: Conjunctivae and EOM are normal. Pupils are equal, round, and reactive to light. Right eye exhibits no discharge. Left eye exhibits no discharge.   Neck: Normal range of motion. Neck supple. "   Cardiovascular: Regular rhythm, normal heart sounds and intact distal pulses.   Mild tachycardia   Pulmonary/Chest: Breath sounds normal. No respiratory distress. She has no wheezes.   Abdominal: Soft. She exhibits no distension. There is no tenderness.   Musculoskeletal:   Tenderness to palpation of left biceps.  Tenderness to palpation of anterior surface of left elbow.  Pain elicited in left biceps during left hand .   Neurological: She is alert and oriented to person, place, and time. She has normal strength. She displays normal reflexes. No cranial nerve deficit or sensory deficit.   Skin: Skin is warm and dry.   Psychiatric:   Anxious affect.  Patient arguing with daughter in room.         ED Course   Procedures  Labs Reviewed   TROPONIN I     EKG Readings: (Independently Interpreted)   EKG: Sinus tachycardia at 104 with no ischemic changes          Medical Decision Making:   History:   Old Medical Records: I decided to obtain old medical records.  Old Records Summarized: records from clinic visits and records from previous admission(s).       <> Summary of Records: 8/20/16- patient seen and evaluated for left distal ulnar/ radial fracture  Initial Assessment:   52 year old woman with history of recent left closed rib fracture on 9/10/17 secondary to a fall presenting with 2 days of left biceps pain worsened with palpation and movement and not responding to daughter's cyclobenzaprine and naproxen. Patient mildly tachycardic to 120s on triage likely secondary to anxiety; heart rate currently 100. Patient mildly tachypneic. Initial ECG showed sinus tachycardia, but otherwise normal. Stat troponin ordered. IV Toradol ordered for localized left biceps pain. Patient likely to be discharged if troponin result is normal.  Differential Diagnosis:   Muscle spasm vs. Radiculopathy vs. ACS  Independently Interpreted Test(s):   I have ordered and independently interpreted EKG Reading(s) - see prior notes  Clinical  Tests:   Lab Tests: Ordered and Reviewed  Medical Tests: Ordered and Reviewed       APC / Resident Notes:   Troponin returned negative. Patient will be discharged at this time.    Brandan Guerra MD  [facility] EM  9/26/17; 1500    Patient advised to rest left arm and avoid motions that provoke the pain, including heavy lifting. She was advised to follow up with her PCP sooner than 11/05/17 if possible for her left arm pain. Patient advised that her pain may be related to a problem with her shoulder that may require orthopedic evaluation if it does not improve with conservative management.    Brandan Guerra MD  [facility] EM  9/26/17; 1521         Attending Attestation:   Physician Attestation Statement for Resident:  As the supervising MD   Physician Attestation Statement: I have personally seen and examined this patient.   I agree with the above history. -:   As the supervising MD I agree with the above PE.   -: Left bicep tenderness with pain on supination   As the supervising MD I agree with the above treatment, course, plan, and disposition.   -: Emergent evaluation of 52-year-old female presenting with left arm pain and tachycardia.  Patient is very anxious on exam with tachypnea, tachycardia, arguing with her daughter on the phone.  Denies recent trauma.      Patient has point tenderness over the left bicep muscle, denies current chest pain or shortness of breath.  She does endorse a five-minute episode chest pain 2 days ago.    I have reviewed and agree with the residents interpretation of the following: EKG and lab data.                    ED Course      Clinical Impression:   The primary encounter diagnosis was Left upper arm pain. A diagnosis of Arm pain was also pertinent to this visit.    Disposition:   Disposition: Discharged  Condition: Stable                        Brandan Guerra MD  Resident  09/26/17 1521       Raya Medina MD  09/27/17 0710

## 2017-09-26 NOTE — ED NOTES
"Patient identifiers verified and correct for Ronda Mello. Upon entering room pt was yelling on phone to daughter - she was quite worked up.     LOC: The patient is awake, alert and aware of environment with an appropriate affect, the patient is oriented x 3 and speaking appropriately. Pt is diaphoretic but states that she is "always sweating a lot"  APPEARANCE: Patient resting comfortably and in some acute distress, patient is clean and well groomed, patient's clothing is properly fastened.  SKIN: The skin is warm and dry, color consistent with ethnicity, patient has normal skin turgor and moist mucus membranes, skin intact, no breakdown or bruising noted.  MUSCULOSKELETAL: Patient moving all extremities spontaneously left upper extr with pain on elevation above shoulder height and upon supination. , no obvious swelling or deformities noted.  RESPIRATORY: Airway is open and patent, respirations are spontaneous, patient has a normal effort and rate, no accessory muscle use noted, bilateral breath sounds clear  CARDIAC: Patient has a increased rate and regular rhythm, no periphreal edema noted, capillary refill < 3 seconds.  ABDOMEN: Soft and non tender to palpation, no distention noted, normoactive bowel sounds present in all four quadrants.  NEUROLOGIC: PERRL, 3 mm bilaterally, eyes open spontaneously, behavior appropriate to situation, follows commands, facial expression symmetrical, bilateral hand grasp equal and even, purposeful motor response noted, normal sensation in all extremities when touched with a finger.    "

## 2017-09-26 NOTE — PROVIDER PROGRESS NOTES - EMERGENCY DEPT.
Encounter Date: 9/26/2017    ED Physician Progress Notes         EKG - STEMI Decision  Initial Reading: No STEMI present.  Response: No Action Needed.

## 2017-09-26 NOTE — ED NOTES
"Pt to room 15 via wheelchair c/o left arm tingling onset last pm.  Hx of cervical strains in the past with similar symptoms.  Describes as thriving pain.  Pt reports taking anti-inflammatory meds, aleve, but no relief.  Placed on cardiac monitor on arrival showing nsr at 107 bpm.  Tells ED provider she had chest pain intermittently recently lasting no more than 5 minutes at a time.    Arm pain is worse with movement, states "i think I was walking and tripped [place] [facility] [place] and fell back, went to hospital, fractured rib underneath my breast, they x-rayed my chest and ribs."  Palpable pain on lateral distal humerus, full rom noted.  "

## 2018-04-24 NOTE — TELEPHONE ENCOUNTER
----- Message from Stephani Coronel LPN sent at 6/14/2017  4:14 PM CDT -----  Pt scheduled for colonoscopy on July 20, 2017.  CPT [address], [place] [id number].  Thanks.   [facility]

## 2019-04-16 ENCOUNTER — HOSPITAL ENCOUNTER (OUTPATIENT)
Facility: OTHER | Age: 54
Discharge: HOME | End: 2019-04-16
Attending: OPHTHALMOLOGY | Admitting: OPHTHALMOLOGY
Payer: MEDICARE

## 2019-04-16 ENCOUNTER — ANESTHESIA (OUTPATIENT)
Dept: SURGERY | Facility: OTHER | Age: 54
End: 2019-04-16
Payer: MEDICARE

## 2019-04-16 ENCOUNTER — ANESTHESIA EVENT (OUTPATIENT)
Dept: SURGERY | Facility: OTHER | Age: 54
End: 2019-04-16
Payer: MEDICARE

## 2019-04-16 VITALS
WEIGHT: 161 LBS | HEIGHT: 63 IN | OXYGEN SATURATION: 96 % | RESPIRATION RATE: 16 BRPM | SYSTOLIC BLOOD PRESSURE: 125 MMHG | BODY MASS INDEX: 28.53 KG/M2 | HEART RATE: 83 BPM | TEMPERATURE: 98 F | DIASTOLIC BLOOD PRESSURE: 65 MMHG

## 2019-04-16 DIAGNOSIS — H25.041 POSTERIOR SUBCAPSULAR POLAR AGE-RELATED CATARACT OF RIGHT EYE: Primary | ICD-10-CM

## 2019-04-16 PROCEDURE — 36000707: Performed by: OPHTHALMOLOGY

## 2019-04-16 PROCEDURE — 37000009 HC ANESTHESIA EA ADD 15 MINS: Performed by: OPHTHALMOLOGY

## 2019-04-16 PROCEDURE — V2632 POST CHMBR INTRAOCULAR LENS: HCPCS | Performed by: OPHTHALMOLOGY

## 2019-04-16 PROCEDURE — 37000008 HC ANESTHESIA 1ST 15 MINUTES: Performed by: OPHTHALMOLOGY

## 2019-04-16 PROCEDURE — 71000015 HC POSTOP RECOV 1ST HR: Performed by: OPHTHALMOLOGY

## 2019-04-16 PROCEDURE — 36000706: Performed by: OPHTHALMOLOGY

## 2019-04-16 PROCEDURE — 25000003 PHARM REV CODE 250: Performed by: OPHTHALMOLOGY

## 2019-04-16 PROCEDURE — 25000003 PHARM REV CODE 250: Performed by: NURSE ANESTHETIST, CERTIFIED REGISTERED

## 2019-04-16 PROCEDURE — 63600175 PHARM REV CODE 636 W HCPCS: Performed by: NURSE ANESTHETIST, CERTIFIED REGISTERED

## 2019-04-16 PROCEDURE — 27201423 OPTIME MED/SURG SUP & DEVICES STERILE SUPPLY: Performed by: OPHTHALMOLOGY

## 2019-04-16 DEVICE — LENS 22.0: Type: IMPLANTABLE DEVICE | Site: EYE | Status: FUNCTIONAL

## 2019-04-16 RX ORDER — MIDAZOLAM HYDROCHLORIDE 1 MG/ML
INJECTION INTRAMUSCULAR; INTRAVENOUS
Status: DISCONTINUED | OUTPATIENT
Start: 2019-04-16 | End: 2019-04-16

## 2019-04-16 RX ORDER — TETRACAINE HYDROCHLORIDE 5 MG/ML
SOLUTION OPHTHALMIC
Status: DISCONTINUED | OUTPATIENT
Start: 2019-04-16 | End: 2019-04-16 | Stop reason: HOSPADM

## 2019-04-16 RX ORDER — CYCLOPENTOLATE HYDROCHLORIDE 10 MG/ML
1 SOLUTION/ DROPS OPHTHALMIC EVERY 5 MIN PRN
Status: COMPLETED | OUTPATIENT
Start: 2019-04-16 | End: 2019-04-16

## 2019-04-16 RX ORDER — LIDOCAINE HYDROCHLORIDE 20 MG/ML
JELLY TOPICAL
Status: DISCONTINUED | OUTPATIENT
Start: 2019-04-16 | End: 2019-04-16 | Stop reason: HOSPADM

## 2019-04-16 RX ORDER — SODIUM CHLORIDE, SODIUM LACTATE, POTASSIUM CHLORIDE, CALCIUM CHLORIDE 600; 310; 30; 20 MG/100ML; MG/100ML; MG/100ML; MG/100ML
INJECTION, SOLUTION INTRAVENOUS CONTINUOUS PRN
Status: DISCONTINUED | OUTPATIENT
Start: 2019-04-16 | End: 2019-04-16

## 2019-04-16 RX ORDER — FENTANYL CITRATE 50 UG/ML
INJECTION, SOLUTION INTRAMUSCULAR; INTRAVENOUS
Status: DISCONTINUED | OUTPATIENT
Start: 2019-04-16 | End: 2019-04-16

## 2019-04-16 RX ORDER — MOXIFLOXACIN 5 MG/ML
1 SOLUTION/ DROPS OPHTHALMIC EVERY 5 MIN PRN
Status: COMPLETED | OUTPATIENT
Start: 2019-04-16 | End: 2019-04-16

## 2019-04-16 RX ORDER — MOXIFLOXACIN 5 MG/ML
SOLUTION/ DROPS OPHTHALMIC
Status: DISCONTINUED | OUTPATIENT
Start: 2019-04-16 | End: 2019-04-16 | Stop reason: HOSPADM

## 2019-04-16 RX ORDER — TROPICAMIDE 10 MG/ML
1 SOLUTION/ DROPS OPHTHALMIC EVERY 5 MIN PRN
Status: COMPLETED | OUTPATIENT
Start: 2019-04-16 | End: 2019-04-16

## 2019-04-16 RX ORDER — PHENYLEPHRINE HYDROCHLORIDE 25 MG/ML
1 SOLUTION/ DROPS OPHTHALMIC EVERY 5 MIN PRN
Status: COMPLETED | OUTPATIENT
Start: 2019-04-16 | End: 2019-04-16

## 2019-04-16 RX ORDER — PREDNISOLONE ACETATE 10 MG/ML
SUSPENSION/ DROPS OPHTHALMIC
Status: DISCONTINUED | OUTPATIENT
Start: 2019-04-16 | End: 2019-04-16 | Stop reason: HOSPADM

## 2019-04-16 RX ADMIN — TROPICAMIDE 1 DROP: 10 SOLUTION/ DROPS OPHTHALMIC at 08:04

## 2019-04-16 RX ADMIN — PHENYLEPHRINE HYDROCHLORIDE 1 DROP: 25 SOLUTION/ DROPS OPHTHALMIC at 08:04

## 2019-04-16 RX ADMIN — MOXIFLOXACIN 1 DROP: 5 SOLUTION/ DROPS OPHTHALMIC at 08:04

## 2019-04-16 RX ADMIN — FENTANYL CITRATE 50 MCG: 50 INJECTION, SOLUTION INTRAMUSCULAR; INTRAVENOUS at 11:04

## 2019-04-16 RX ADMIN — SODIUM CHLORIDE, SODIUM LACTATE, POTASSIUM CHLORIDE, AND CALCIUM CHLORIDE: .6; .31; .03; .02 INJECTION, SOLUTION INTRAVENOUS at 10:04

## 2019-04-16 RX ADMIN — MIDAZOLAM HYDROCHLORIDE 2 MG: 1 INJECTION, SOLUTION INTRAMUSCULAR; INTRAVENOUS at 11:04

## 2019-04-16 RX ADMIN — CYCLOPENTOLATE HYDROCHLORIDE 1 DROP: 10 SOLUTION/ DROPS OPHTHALMIC at 08:04

## 2019-04-16 NOTE — ANESTHESIA PREPROCEDURE EVALUATION
04/16/2019  Ronda Mello is a 54 y.o., female.    Anesthesia Evaluation    I have reviewed the Patient Summary Reports.    I have reviewed the Nursing Notes.   I have reviewed the Medications.     Review of Systems  Anesthesia Hx:  Denies Family Hx of Anesthesia complications.   Denies Personal Hx of Anesthesia complications.   Social:  Former Smoker    Hematology/Oncology:         -- Cancer in past history (uterine, small bowel): surgery and radiation    Cardiovascular:   Exercise tolerance: poor Hypertension    Pulmonary:   COPD    Hepatic/GI:   Hepatitis, C    Musculoskeletal:   Arthritis   Spine Disorders: cervical    Neurological:  Neurology Normal    Endocrine:   Hyperthyroidism    Psych:   Psychiatric History (schizo-affective disorder)          Physical Exam  General:  Well nourished    Airway/Jaw/Neck:  Airway Findings: Mouth Opening: Normal Tongue: Normal  General Airway Assessment: Adult  Mallampati: II  TM Distance: Normal, at least 6 cm      Dental:  Dental Findings: Periodontal disease, Severe         Mental Status:  Mental Status Findings:  Alert and Oriented, Cooperative         Anesthesia Plan  Type of Anesthesia, risks & benefits discussed:  Anesthesia Type:  MAC  Patient's Preference:   Intra-op Monitoring Plan: standard ASA monitors  Intra-op Monitoring Plan Comments:   Post Op Pain Control Plan:   Post Op Pain Control Plan Comments:   Induction:    Beta Blocker:         Informed Consent: Patient understands risks and agrees with Anesthesia plan.  Questions answered. Anesthesia consent signed with patient.  ASA Score: 3     Day of Surgery Review of History & Physical:    H&P update referred to the surgeon.         Ready For Surgery From Anesthesia Perspective.

## 2019-04-16 NOTE — INTERVAL H&P NOTE
The patient has been examined and the H&P has been reviewed:    I concur with the findings and no changes have occurred since H&P was written.    Anesthesia/Surgery risks, benefits and alternative options discussed and understood by patient/family.          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

## 2019-04-16 NOTE — DISCHARGE INSTRUCTIONS
No heavy activity. No bending over. Keep eye shielded at all times except to put eye drops.Use vigamox, prednisolone acetate and nevanac (or ketorolac) four times a day (if ilevro or prolensa once a day) in the operate eye starting today.      Anesthesia: Monitored Anesthesia Care (MAC)    Anesthesia Safety  · Have an adult family member or friend drive you home after the procedure.  · For the first 24 hours after your surgery:  ¨ Do not drive or use heavy equipment.  ¨ Do not make important decisions or sign documents.  ¨ Avoid alcohol.  ¨ Have someone stay with you, if possible. They can watch for problems and help keep you safe.

## 2019-04-16 NOTE — OP NOTE
DATE OF PROCEDURE:  4/16/19    PREOPERATIVE DIAGNOSES:  1.  Posterior polar cataract, right eye  2.  Poor red reflex, right eye.  3.  Small pupil, right eye      POSTOPERATIVE DIAGNOSES:  1.  Posterior polar cataract, right eye  2.  Poor red reflex, right eye.  3.  Small pupil, right eye        PROCEDURE PERFORMED:  Phaco with PCIOL with trypan blue and iris hooks, right eye.    ANESTHESIA:  MAC/topical.    COMPLICATIONS:  None.    SURGEON: Yun Frank MD    ASSISTANT: Irwin Nation MD    PROCEDURE IN DETAIL:  The patient received preservative-free lidocaine gel in   her right eye in the holding area.  The patient was brought into the Operating Room.    The patient received mild sedation by Anesthesia.  The patient's right eye was   prepped and draped in the usual sterile manner for Ophthalmology.  A lid   speculum was placed to open the right eyelids.  Surgery was done under the   microscope.  Under the microscope, it was noted that there was a poor red   reflex secondary to a 3+ posterior polar cataract and a small pupil. Using the 0.12s and side-port   incision blade, a paracentesis was done at the 12 o'clock position.    Nonpreserved intracameral lidocaine was injected into the anterior chamber   followed by air, followed by trypan blue, followed by Viscoat.  Using the   keratome blade, a clear corneal incision was done at the 9 o'clock position.  Then 5 iris hooks inserted and the pupil dilated.  Using cystotome and Utrata forceps, a capsulorrhexis was performed without   Complications but with difficulty as it had adhesions inferotemporally that required microscissors to complete it.  Because of the posterior polar cataract, no hydrodissection was performed.  Using   the phaco machine, a central opening in nucleus was done and then inside out hydrodelination was done from inside the nucleus. Then very gentle and slow phacoemulsification with low settings (bottle pop lowered) done of the nucleus. Then,  gentle viscodissection was done and the epinucleus gently removed. Then cortical material was aspirated.  The anterior capsular rim was   polished.  Viscoelastic was injected into the capsular bag and a 22.0 diopter   [id number] PCIOL was injected into the capsular bag without complications.  The iris hooks were removed.  Viscoelastic was removed from the eye.  The wound and paracentesis were hydrated   with BSS.  No leaks were noted.  Vigamox and Omnipred eye drops were applied   to the eye. A small corneal abrasion was seen at the wound so a bandage contact lens was placed. The eye was shielded.  The patient tolerated the procedure well and   went back to her room in stable condition.  The patient will be seen the next   morning at the [facility].

## 2019-04-16 NOTE — PLAN OF CARE
Ronda Mello has met all discharge criteria from Phase II. Vital Signs are stable, ambulating  without difficulty. Discharge instructions given, patient verbalized understanding. Discharged from facility via wheelchair in stable condition.

## 2019-04-16 NOTE — ANESTHESIA POSTPROCEDURE EVALUATION
Anesthesia Post Evaluation    Patient: Ronda Mello    Procedure(s) Performed: Procedure(s) (LRB):  EXTRACTION, CATARACT, WITH IOL INSERTION (Right)    Final Anesthesia Type: MAC  Patient location during evaluation: OPS  Patient participation: Yes- Able to Participate  Level of consciousness: awake and alert and oriented  Post-procedure vital signs: reviewed and stable  Pain management: adequate  Airway patency: patent  PONV status at discharge: No PONV  Anesthetic complications: no      Cardiovascular status: stable  Respiratory status: unassisted, spontaneous ventilation and room air  Hydration status: euvolemic  Follow-up not needed.          Vitals Value Taken Time   /79 4/16/2019  8:29 AM   Temp 36.9 °C (98.5 °F) 4/16/2019  8:19 AM   Pulse 83 4/16/2019  8:19 AM   Resp 18 4/16/2019  8:19 AM   SpO2 99 % 4/16/2019  8:19 AM         No case tracking events are documented in the log.      Pain/Brandie Score: No data recorded

## 2019-04-16 NOTE — DISCHARGE SUMMARY
Patient came for cataract surgery in her right eye. Patient had cataract surgery done without complications. Patient tolerated procedure well and will be discharge home today. Pt will follow up at the eye clinic tomorrow. See orders for post operative instructions

## 2019-06-05 ENCOUNTER — HOSPITAL ENCOUNTER (EMERGENCY)
Facility: HOSPITAL | Age: 54
Discharge: HOME | End: 2019-06-05
Attending: EMERGENCY MEDICINE
Payer: MEDICARE

## 2019-06-05 VITALS
OXYGEN SATURATION: 98 % | HEIGHT: 63 IN | DIASTOLIC BLOOD PRESSURE: 60 MMHG | SYSTOLIC BLOOD PRESSURE: 118 MMHG | BODY MASS INDEX: 29.06 KG/M2 | WEIGHT: 164 LBS | HEART RATE: 84 BPM | TEMPERATURE: 99 F | RESPIRATION RATE: 18 BRPM

## 2019-06-05 VITALS
WEIGHT: 164 LBS | HEIGHT: 63 IN | RESPIRATION RATE: 16 BRPM | OXYGEN SATURATION: 100 % | TEMPERATURE: 99 F | HEART RATE: 82 BPM | BODY MASS INDEX: 29.06 KG/M2 | DIASTOLIC BLOOD PRESSURE: 58 MMHG | SYSTOLIC BLOOD PRESSURE: 114 MMHG

## 2019-06-05 DIAGNOSIS — R34 ANURIA: Primary | ICD-10-CM

## 2019-06-05 LAB
ALBUMIN SERPL BCP-MCNC: 3.2 G/DL (ref 3.5–5.2)
ALP SERPL-CCNC: 65 U/L (ref 55–135)
ALT SERPL W/O P-5'-P-CCNC: 23 U/L (ref 10–44)
ANION GAP SERPL CALC-SCNC: 11 MMOL/L (ref 8–16)
AST SERPL-CCNC: 31 U/L (ref 10–40)
BASOPHILS # BLD AUTO: 0.01 K/UL (ref 0–0.2)
BASOPHILS NFR BLD: 0.1 % (ref 0–1.9)
BILIRUB SERPL-MCNC: 0.4 MG/DL (ref 0.1–1)
BUN SERPL-MCNC: 16 MG/DL (ref 6–20)
CALCIUM SERPL-MCNC: 9.5 MG/DL (ref 8.7–10.5)
CHLORIDE SERPL-SCNC: 100 MMOL/L (ref 95–110)
CO2 SERPL-SCNC: 26 MMOL/L (ref 23–29)
CREAT SERPL-MCNC: 0.6 MG/DL (ref 0.5–1.4)
DIFFERENTIAL METHOD: ABNORMAL
EOSINOPHIL # BLD AUTO: 0.1 K/UL (ref 0–0.5)
EOSINOPHIL NFR BLD: 0.4 % (ref 0–8)
ERYTHROCYTE [DISTWIDTH] IN BLOOD BY AUTOMATED COUNT: 11.9 % (ref 11.5–14.5)
EST. GFR  (AFRICAN AMERICAN): >60 ML/MIN/1.73 M^2
EST. GFR  (NON AFRICAN AMERICAN): >60 ML/MIN/1.73 M^2
GLUCOSE SERPL-MCNC: 122 MG/DL (ref 70–110)
HCT VFR BLD AUTO: 40.8 % (ref 37–48.5)
HGB BLD-MCNC: 13.6 G/DL (ref 12–16)
LACTATE SERPL-SCNC: 1 MMOL/L (ref 0.5–2.2)
LYMPHOCYTES # BLD AUTO: 3.9 K/UL (ref 1–4.8)
LYMPHOCYTES NFR BLD: 29.4 % (ref 18–48)
MCH RBC QN AUTO: 28.9 PG (ref 27–31)
MCHC RBC AUTO-ENTMCNC: 33.3 G/DL (ref 32–36)
MCV RBC AUTO: 87 FL (ref 82–98)
MONOCYTES # BLD AUTO: 1.4 K/UL (ref 0.3–1)
MONOCYTES NFR BLD: 10.4 % (ref 4–15)
NEUTROPHILS # BLD AUTO: 8 K/UL (ref 1.8–7.7)
NEUTROPHILS NFR BLD: 59.5 % (ref 38–73)
PLATELET # BLD AUTO: 269 K/UL (ref 150–350)
PMV BLD AUTO: 9.1 FL (ref 9.2–12.9)
POTASSIUM SERPL-SCNC: 3.6 MMOL/L (ref 3.5–5.1)
PROT SERPL-MCNC: 7.2 G/DL (ref 6–8.4)
RBC # BLD AUTO: 4.71 M/UL (ref 4–5.4)
SODIUM SERPL-SCNC: 137 MMOL/L (ref 136–145)
WBC # BLD AUTO: 13.41 K/UL (ref 3.9–12.7)

## 2019-06-05 PROCEDURE — 85025 COMPLETE CBC W/AUTO DIFF WBC: CPT

## 2019-06-05 PROCEDURE — 87040 BLOOD CULTURE FOR BACTERIA: CPT | Mod: 59

## 2019-06-05 PROCEDURE — 63600175 PHARM REV CODE 636 W HCPCS: Performed by: PHYSICIAN ASSISTANT

## 2019-06-05 PROCEDURE — 80053 COMPREHEN METABOLIC PANEL: CPT

## 2019-06-05 PROCEDURE — 99285 EMERGENCY DEPT VISIT HI MDM: CPT | Mod: ,,, | Performed by: PHYSICIAN ASSISTANT

## 2019-06-05 PROCEDURE — 99285 PR EMERGENCY DEPT VISIT,LEVEL V: ICD-10-PCS | Mod: ,,, | Performed by: PHYSICIAN ASSISTANT

## 2019-06-05 PROCEDURE — 99284 EMERGENCY DEPT VISIT MOD MDM: CPT | Mod: 25,27

## 2019-06-05 PROCEDURE — 99285 EMERGENCY DEPT VISIT HI MDM: CPT | Mod: 25

## 2019-06-05 PROCEDURE — 51702 INSERT TEMP BLADDER CATH: CPT

## 2019-06-05 PROCEDURE — 96365 THER/PROPH/DIAG IV INF INIT: CPT

## 2019-06-05 PROCEDURE — 25000003 PHARM REV CODE 250: Performed by: PHYSICIAN ASSISTANT

## 2019-06-05 PROCEDURE — 83605 ASSAY OF LACTIC ACID: CPT

## 2019-06-05 RX ORDER — IBUPROFEN 600 MG/1
600 TABLET ORAL
Status: COMPLETED | OUTPATIENT
Start: 2019-06-05 | End: 2019-06-05

## 2019-06-05 RX ADMIN — CEFTRIAXONE 2 G: 2 INJECTION, SOLUTION INTRAVENOUS at 09:06

## 2019-06-05 RX ADMIN — IBUPROFEN 600 MG: 600 TABLET ORAL at 08:06

## 2019-06-05 RX ADMIN — SODIUM CHLORIDE 1000 ML: 0.9 INJECTION, SOLUTION INTRAVENOUS at 08:06

## 2019-06-06 ENCOUNTER — HOSPITAL ENCOUNTER (EMERGENCY)
Facility: HOSPITAL | Age: 54
Discharge: HOME | End: 2019-06-06
Attending: EMERGENCY MEDICINE
Payer: MEDICARE

## 2019-06-06 DIAGNOSIS — R33.9 URINARY RETENTION: Primary | ICD-10-CM

## 2019-06-06 LAB
BILIRUB UR QL STRIP: NEGATIVE
CLARITY UR REFRACT.AUTO: ABNORMAL
COLOR UR AUTO: ABNORMAL
GLUCOSE UR QL STRIP: NEGATIVE
HGB UR QL STRIP: ABNORMAL
KETONES UR QL STRIP: NEGATIVE
LEUKOCYTE ESTERASE UR QL STRIP: NEGATIVE
MICROSCOPIC COMMENT: ABNORMAL
NITRITE UR QL STRIP: NEGATIVE
PH UR STRIP: 5 [PH] (ref 5–8)
PROT UR QL STRIP: NEGATIVE
RBC #/AREA URNS AUTO: 8 /HPF (ref 0–4)
SP GR UR STRIP: 1.02 (ref 1–1.03)
URN SPEC COLLECT METH UR: ABNORMAL
WBC #/AREA URNS AUTO: 2 /HPF (ref 0–5)

## 2019-06-06 PROCEDURE — 81001 URINALYSIS AUTO W/SCOPE: CPT

## 2019-06-06 PROCEDURE — 25000003 PHARM REV CODE 250: Performed by: PHYSICIAN ASSISTANT

## 2019-06-06 RX ORDER — CEPHALEXIN 500 MG/1
500 CAPSULE ORAL EVERY 12 HOURS
Qty: 14 CAPSULE | Refills: 0 | Status: SHIPPED | OUTPATIENT
Start: 2019-06-06 | End: 2019-06-13

## 2019-06-06 RX ORDER — CEPHALEXIN 500 MG/1
500 CAPSULE ORAL
Status: COMPLETED | OUTPATIENT
Start: 2019-06-06 | End: 2019-06-06

## 2019-06-06 RX ADMIN — CEPHALEXIN 500 MG: 500 CAPSULE ORAL at 02:06

## 2019-06-06 NOTE — CONSULTS
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 She presents the ED with complaints of "no urination for the past 4 days".  Patient states she has not been able to urinate at all for 4 days despite drinking plenty of water.  She admits to some mild pelvic pressure and pain. She denies any flank pain, fever, chills, nausea, vomiting, diarrhea, constipation.  She reports that she usually does not have issues urinating and this is a new problem. She endorses intermittent gross hematuria for the past several years. She reports that her brother and father both  of kidney cancer. She has a 35 pack year smoking history.     Multiple attempts to pass a catheter by nursing in the ED in [place] were unsuccessful. A 14 Fr silicone catheter was placed by Urology with the return of ~400ml of dark yellow urine.     CT RSS revealed no hydronephrosis and a smooth contoured, somewhat distended bladder.     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     Thyroid disease     Uterine cancer     adenocarcinoma ot uterus       Past Surgical History:   Procedure Laterality Date    CHOLECYSTECTOMY      COLONOSCOPY N/A 7/20/2017    [facility]    COLONOSCOPY N/A 10/15/2015    [facility]    EXCISION-LESION-EYELID LOWER  / RESECTION LOWER EYELID Right 1/15/2016    Performed by Raffaele [facility]y [facility] [place] OR    EXTRACTION, CATARACT, WITH IOL INSERTION Right 4/16/2019    Performed by Yun Frank MD at [facility] [place] OR    HYSTERECTOMY      total w/ removal of both ovaries    UMBILICAL HERNIA REPAIR         Review of patient's allergies indicates:   Allergen Reactions    Norco [hydrocodone-acetaminophen] Hives       Family History     Problem Relation (Age of Onset)    Arthritis     Cancer     Heart disease           Tobacco Use    Smoking status: Current Every Day Smoker     Packs/day: 1.00     Years: 35.00     Pack years: 35.00     Types: Cigarettes     Last attempt to quit: 12/9/2015     Years since quitting: 3.4    Smokeless tobacco: Never Used   Substance and Sexual Activity    Alcohol use: Yes     Alcohol/week: 0.0 oz     Comment: Admits to at least 2 beers daily but states "I am not an alcoholic"    Drug use: No    Sexual activity: Not on file       Review of Systems   Constitutional: Negative for activity change, chills and fever.   HENT: Negative for facial swelling.    Eyes: Negative for discharge.   Respiratory: Negative for apnea and shortness of breath.    Cardiovascular: Negative for chest pain.   Gastrointestinal: Positive for abdominal distention. Negative for abdominal pain.   Genitourinary: Positive for decreased urine volume, difficulty urinating and hematuria. Negative for dysuria and flank pain. "   Musculoskeletal: Negative for arthralgias.   Skin: Negative for color change.   Neurological: Negative for dizziness.   Hematological: Negative for adenopathy.   Psychiatric/Behavioral: Negative for agitation.       Objective:     Temp:  [98.8 °F (37.1 °C)-100.7 °F (38.2 °C)] 98.8 °F (37.1 °C)  Pulse:  [82-97] 84  Resp:  [16-18] 18  SpO2:  [95 %-100 %] 98 %  BP: (114-130)/(58-70) 118/60     Body mass index is 29.05 kg/m².      Bladder Scan Volume (mL): 330 mL (06/06/19 0123)    Drains     Drain                 Urethral Catheter 06/06/19 0209 Non-latex 14 Fr. less than 1 day                Physical Exam   Nursing note and vitals reviewed.  Constitutional: She is oriented to person, place, and time. She appears well-developed and well-nourished. No distress.   Disheveled appearing female   HENT:   Head: Normocephalic and atraumatic.   Eyes: Pupils are equal, round, and reactive to light. Right eye exhibits no discharge. Left eye exhibits no discharge.   Neck: Normal range of motion. Neck supple.   Cardiovascular: Normal rate and intact distal pulses.    Pulmonary/Chest: Effort normal and breath sounds normal. No respiratory distress.   Abdominal: Soft. She exhibits distension. There is no tenderness.   Palpable bladder   Genitourinary:   Genitourinary Comments: Poor hygiene  Urethra meatus visible with narrow opening and dense scar tissue around meatus.   No palpable masses, limited exam due to patient positioning on stretcher    14 Fr silicone catheter placed   Musculoskeletal: Normal range of motion. She exhibits no edema.   Neurological: She is alert and oriented to person, place, and time. No cranial nerve deficit.   Skin: Skin is warm and dry. She is not diaphoretic.     Psychiatric: She has a normal mood and affect. Her behavior is normal. Judgment and thought content normal.       Significant Labs:    BMP:  Recent Labs   Lab 06/05/19 2036      K 3.6      CO2 26   BUN 16   CREATININE 0.6   CALCIUM  9.5       CBC:  Recent Labs   Lab 06/05/19 2036   WBC 13.41*   HGB 13.6   HCT 40.8          Urine Culture: No results for input(s): LABURIN in the last 168 hours.  Urine Studies: No results for input(s): COLORU, APPEARANCEUA, PHUR, SPECGRAV, PROTEINUA, GLUCUA, KETONESU, BILIRUBINUA, OCCULTUA, NITRITE, UROBILINOGEN, LEUKOCYTESUR, RBCUA, WBCUA, BACTERIA, SQUAMEPITHEL, HYALINECASTS in the last 168 hours.    Invalid input(s): WRIGHTSUR    Significant Imaging:  All pertinent imaging results/findings from the past 24 hours have been reviewed.                    Assessment and Plan:     Urinary retention  53 yo F with history of darling syndrome and urinary retention.    -14 Fr silicone catheter placed with return of 400ml of urine  -Admission per hospital medicine/ ED  -Recommend treating with 1 week of empiric antibiotics  -Send urine for culture  -She will need to follow up with urology in 1 week for voiding trial/ to discuss outpatient gross hematuria workup.         VTE Risk Mitigation (From admission, onward)    None          Thank you for your consult. I will sign off. Please contact us if you have any additional questions.    Gregory Silverman MD  Urology  [facility]    Agree with the above.

## 2019-06-06 NOTE — HPI
"53 yo F with pmh darling syndrome, COPD, stage 1B endometrial cancer s/p ANJANA-BSO and adjuvant radiation therapy, duodenal adenocarcinoma s/p resection, schizophrenia, schizoaffective disorder, and recently diagnosed colon cancer not yet treated. She presents the ED with complaints of "no urination for the past 4 days".  Patient states she has not been able to urinate at all for 4 days despite drinking plenty of water.  She admits to some mild pelvic pressure and pain. She denies any flank pain, fever, chills, nausea, vomiting, diarrhea, constipation.  She reports that she usually does not have issues urinating and this is a new problem. She endorses intermittent gross hematuria for the past several years. She reports that her brother and father both  of kidney cancer. She has a 35 pack year smoking history.     CT RSS revealed no hydronephrosis and a smooth contoured, somewhat distended bladder.     Cr is 0.6    A 14 Fr silicone catheter was placed with the return of ~400ml of dark yellow urine.        "

## 2019-06-06 NOTE — ED PROVIDER NOTES
Encounter Date: 6/5/2019       History     Chief Complaint   Patient presents with    Urinary Retention     Unable to urinate for 4 day. Multiple attempted made at [facility] prior to transfer without success. 250cc     54-year-old female presents to the ER as a transfer from [facility] for Urology evaluation of urinary retention.  Patient reports the inability to urinate for 4 days.  No acute findings on labs or CT imaging at outside facility.  Outside facility was on able to place a catheter, attempted 5 times.   The patient endorses urinary urgency and suprapubic fullness.         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     Thyroid disease     Uterine cancer     adenocarcinoma ot uterus     Past Surgical History:   Procedure Laterality Date    CHOLECYSTECTOMY      COLONOSCOPY N/A 7/20/2017    [facility]    COLONOSCOPY N/A 10/15/2015    [facility]    EXCISION-LESION-EYELID LOWER  / RESECTION LOWER EYELID Right 1/15/2016    [facility]    EXTRACTION, CATARACT, WITH IOL INSERTION Right 4/16/2019    [facility] at [facility]    HYSTERECTOMY      total w/ removal of both ovaries    UMBILICAL HERNIA REPAIR       Family History   Problem Relation Age of Onset    Cancer Unknown     Arthritis Unknown     Heart disease Unknown   "    Social History     Tobacco Use    Smoking status: Current Every Day Smoker     Packs/day: 1.00     Years: 35.00     Pack years: 35.00     Types: Cigarettes     Last attempt to quit: 12/9/2015     Years since quitting: 3.4    Smokeless tobacco: Never Used   Substance Use Topics    Alcohol use: Yes     Alcohol/week: 0.0 oz     Comment: Admits to at least 2 beers daily but states "I am not an alcoholic"    Drug use: No     Review of Systems   Constitutional: Negative for fever.   HENT: Negative for sore throat.    Respiratory: Negative for shortness of breath.    Cardiovascular: Negative for chest pain.   Gastrointestinal: Negative for nausea.   Genitourinary: Positive for urgency. Negative for dysuria.   Musculoskeletal: Negative for back pain.   Skin: Negative for rash.   Neurological: Negative for weakness.   Hematological: Does not bruise/bleed easily.       Physical Exam     Initial Vitals [06/05/19 2328]   BP Pulse Resp Temp SpO2   118/60 84 18 98.8 °F (37.1 °C) 98 %      MAP       --         Physical Exam    Constitutional: Vital signs are normal. She appears well-developed and well-nourished. She is not diaphoretic. No distress.   HENT:   Head: Normocephalic and atraumatic.   Eyes: Conjunctivae are normal.   Cardiovascular: Normal rate, regular rhythm and normal heart sounds. Exam reveals no gallop.    No murmur heard.  Abdominal: Soft. Normal appearance and bowel sounds are normal.   Abdomen is soft and nontender, no distension  Bedside bladder scan reveals approximately 330 cc of urine   Musculoskeletal: Normal range of motion.   Neurological: She is alert and oriented to person, place, and time.   Skin: Skin is warm and intact.   Psychiatric: She has a normal mood and affect. Her speech is normal and behavior is normal. Cognition and memory are normal.         ED Course   Procedures  Labs Reviewed - No data to display       Imaging Results    None          Medical Decision Making:   History:   Old " Medical Records: I decided to obtain old medical records.  Initial Assessment:   54-year-old female with urinary retention  Differential Diagnosis:   Cystitis, UTI, bladder outlet obstruction  ED Management:  Case discussed with Urology, they will come see the patient  Urology successfully placed a Peres catheter  Patient will be started on empiric antibiotics and discharged home  She will follow up with them in clinic next week  Other:   I discussed test(s) with the performing physician.  I have discussed this case with another health care provider.                      Clinical Impression:       ICD-10-CM ICD-9-CM   1. Urinary retention R33.9 788.20         Disposition:   Disposition: Discharged  Condition: Stable                        Zev Salcedo PA-C  06/06/19 0228

## 2019-06-06 NOTE — ED PROVIDER NOTES
Encounter Date: 6/5/2019       History     Chief Complaint   Patient presents with    Female  Problem     To ER per EMS with c/o not urinating for four days.     54-year-old female with PMH of COPD, uterine cancer S/PE radiation therapy, schizophrenia, schizoaffective, thyroid disease, active colon cancer not yet treated presents the ED with complaints of no urination x4 days.  Patient states she has not been able to urinate at all for 4 days despite drinking plenty of water.  She admits to some mild pelvic pressure and pain. She denies any flank pain, fever, chills, nausea, vomiting, diarrhea, constipation.    The history is provided by the patient. No  was used.     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     Thyroid disease     Uterine cancer     adenocarcinoma ot uterus     Past Surgical History:   Procedure Laterality Date    CHOLECYSTECTOMY      COLONOSCOPY N/A 7/20/2017    [facility]    COLONOSCOPY N/A 10/15/2015    [facility]    EXCISION-LESION-EYELID LOWER  / RESECTION LOWER EYELID Right 1/15/2016    [facility]    EXTRACTION, CATARACT, WITH IOL INSERTION Right 4/16/2019    [facility]un [facility] at [facility] OR    HYSTERECTOMY      total w/ removal of both ovaries    UMBILICAL HERNIA REPAIR       Family  "History   Problem Relation Age of Onset    Cancer Unknown     Arthritis Unknown     Heart disease Unknown      Social History     Tobacco Use    Smoking status: Current Every Day Smoker     Packs/day: 1.00     Years: 35.00     Pack years: 35.00     Types: Cigarettes     Last attempt to quit: 12/9/2015     Years since quitting: 3.4    Smokeless tobacco: Never Used   Substance Use Topics    Alcohol use: Yes     Alcohol/week: 0.0 oz     Comment: Admits to at least 2 beers daily but states "I am not an alcoholic"    Drug use: No     Review of Systems   Constitutional: Negative for chills and fever.   Gastrointestinal: Positive for abdominal pain. Negative for constipation, diarrhea, nausea and vomiting.   Genitourinary: Positive for decreased urine volume, difficulty urinating and pelvic pain.   All other systems reviewed and are negative.      Physical Exam     Initial Vitals [06/05/19 1939]   BP Pulse Resp Temp SpO2   130/70 97 16 (!) 100.7 °F (38.2 °C) 95 %      MAP       --         Physical Exam    Nursing note and vitals reviewed.  Constitutional: Vital signs are normal. She appears well-developed and well-nourished. No distress.   HENT:   Head: Normocephalic and atraumatic.   Nose: Nose normal.   Eyes: Conjunctivae and lids are normal.   Neck: Normal range of motion and phonation normal.   Cardiovascular: Normal rate, regular rhythm and normal heart sounds.   Pulmonary/Chest: Effort normal and breath sounds normal.   Abdominal: Soft. Normal appearance and bowel sounds are normal. There is tenderness in the suprapubic area. There is no rigidity, no rebound, no guarding and no CVA tenderness.   Genitourinary: Pelvic exam was performed with patient supine.   Genitourinary Comments: Present while nurse attempted to catheterize patient and was unsuccessful, foul odor from patient  area. Normal external anatomy   Musculoskeletal: Normal range of motion.   Neurological: She is alert and oriented to person, " place, and time.   Skin: Skin is warm and dry.   Psychiatric: She has a normal mood and affect. Her speech is normal and behavior is normal. Judgment and thought content normal. Cognition and memory are normal.         ED Course   Procedures  Labs Reviewed   CBC W/ AUTO DIFFERENTIAL - Abnormal; Notable for the following components:       Result Value    WBC 13.41 (*)     MPV 9.1 (*)     Gran # (ANC) 8.0 (*)     Mono # 1.4 (*)     All other components within normal limits   COMPREHENSIVE METABOLIC PANEL - Abnormal; Notable for the following components:    Glucose 122 (*)     Albumin 3.2 (*)     All other components within normal limits   CULTURE, BLOOD   CULTURE, BLOOD   LACTIC ACID, PLASMA   URINALYSIS, REFLEX TO URINE CULTURE          Imaging Results          CT Renal Stone Study ABD Pelvis WO (Final result)  Result time 06/05/19 22:49:52    Final result by Martin Mendes MD (06/05/19 22:49:52)                 Impression:      1.  No evidence of hydronephrosis or obstructing ureteral calculus.  Mildly distended bladder with normal contour.    2.  Postoperative change of the stomach and cholecystectomy.  Small volume of pneumobilia.    3.  Additional incidental findings as above.      Electronically signed by: Martin Mendes MD  Date:    06/05/2019  Time:    22:49             Narrative:    EXAMINATION:  CT RENAL STONE STUDY ABD PELVIS WO    CLINICAL HISTORY:  no urine passed for 4 days;    TECHNIQUE:  Low dose axial images, sagittal and coronal reformations were obtained from the lung bases to the pubic symphysis.  Contrast was not administered.    COMPARISON:  CT 03/07/2011    FINDINGS:  The lung bases are unremarkable.  There is no pleural fluid present.  The visualized portions of the heart appear normal.    The liver is normal in size.  No focal hepatic abnormality appreciated on this noncontrast examination.  Gallbladder appears to be surgically absent.  There is pneumobilia present.  No significant  biliary ductal dilatation appreciated.    There are postoperative changes of the stomach.  The spleen, pancreas, and adrenal glands appear within normal limits for noncontrast technique.    The kidneys are normal in size and location. There is no evidence of hydronephrosis or nephrolithiasis.  No definite ureteral calculi identified.  Urinary bladder is mildly distended.  Bladder contours appear within normal limits.  The uterus is surgically absent.  There is no significant free fluid within the pelvis.    The abdominal aorta is normal in course and caliber with mild atherosclerotic calcification along its course.  There are surgical clips noted along the bilateral iliac regions.The visualized loops of small and large bowel show no evidence of obstruction or inflammation.  The appendix is not definitely visualized, however no focal inflammatory changes appreciated at its expected location.  There is postoperative change of prior ventral hernia repair.  There is no ascites or free intraperitoneal air.    There is grade 1 anterolisthesis of L5 on S1 with degenerative changes of the lumbar spine.  The extraperitoneal soft tissues are unremarkable.                                 Medical Decision Making:   Initial Assessment:   55 yo female who has not urinated in 4 days. Active colon cancer, not yet treatment, and hx of uterine cancer s/p radiation. Patient is febrile at 100.7F on arrival. Denies fevers prior to this.   Differential Diagnosis:   Obstruction, dehydration, CARLIE, UTI, Sepsis  Clinical Tests:   Lab Tests: Ordered and Reviewed  Radiological Study: Ordered and Reviewed  ED Management:  WBC 13. BUN/Cr and GFR WNL. Bladder scan shows 256mL urine in the bladder. Nurse attempted to catheterize the patient multiple times, including attempt with pediatric straight cath without success.   Motrin given for fever which responded appropriately.  9:07 PM  I discussed this patient with the Transfer center and [place]rolog[place] [facility] [place] agree with [place] for further urological evaluation and treatment.   Ct results pended at this time.   Patient is in stable condition with normal vital signs at time of transfer.                      Clinical Impression:       ICD-10-CM ICD-9-CM   1. Anuria R34 788.5                                Lisa Rutherford PA-C  06/05/19 0696

## 2019-06-06 NOTE — DISCHARGE INSTRUCTIONS
Follow-up with Urology  Call them tomorrow for an appointment next week and return to the ER as needed  Take all antibiotics until complete    Our goal in the emergency department is to always give you outstanding care and exceptional service. You may receive a survey by mail or e-mail in the next week regarding your experience in our ED. We would greatly appreciate your completing and returning the survey. Your feedback provides us with a way to recognize our staff who give very good care and it helps us learn how to improve when your experience was below our aspiration of excellence.

## 2019-06-06 NOTE — ASSESSMENT & PLAN NOTE
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    -14 Fr silicone catheter placed with return of 400ml of urine  -Admission per hospital medicine/ ED  -Recommend treating with 1 week of empiric antibiotics  -Send urine for culture  -She will need to follow up with urology in 1 week for voiding trial/ to discuss outpatient gross hematuria workup.

## 2019-06-06 NOTE — SUBJECTIVE & OBJECTIVE
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     Thyroid disease     Uterine cancer     adenocarcinoma ot uterus       Past Surgical History:   Procedure Laterality Date    CHOLECYSTECTOMY      COLONOSCOPY N/A 7/20/2017    [facility]    COLONOSCOPY N/A 10/15/2015    [facility]    EXCISION-LESION-EYELID LOWER  / RESECTION LOWER EYELID Right 1/15/2016    [facility]formed by [facility]y [facility] at [facility] OR    EXTRACTION, CATARACT, WITH IOL INSERTION Right 4/16/2019    Performed by Yun Frank [facility] at [facility] OR    HYSTERECTOMY      total w/ removal of both ovaries    UMBILICAL HERNIA REPAIR         Review of patient's allergies indicates:   Allergen Reactions    Norco [hydrocodone-acetaminophen] Hives       Family History     Problem Relation (Age of Onset)    Arthritis     Cancer     Heart disease           Tobacco Use    Smoking status: Current Every Day Smoker     Packs/day: 1.00     Years: 35.00     Pack years: 35.00     Types: Cigarettes     Last attempt to quit: 12/9/2015     Years since quitting: 3.4    Smokeless tobacco: Never Used   Substance and Sexual Activity    Alcohol use: Yes     Alcohol/week: 0.0 oz     Comment: Admits to at least 2 beers daily but states "I am not an alcoholic"    Drug use: No    Sexual activity: Not on file       Review of Systems   Constitutional: Negative for activity change, chills and fever.   HENT: Negative for facial swelling.    Eyes: Negative for " discharge.   Respiratory: Negative for apnea and shortness of breath.    Cardiovascular: Negative for chest pain.   Gastrointestinal: Positive for abdominal distention. Negative for abdominal pain.   Genitourinary: Positive for decreased urine volume, difficulty urinating and hematuria. Negative for dysuria and flank pain.   Musculoskeletal: Negative for arthralgias.   Skin: Negative for color change.   Neurological: Negative for dizziness.   Hematological: Negative for adenopathy.   Psychiatric/Behavioral: Negative for agitation.       Objective:     Temp:  [98.8 °F (37.1 °C)-100.7 °F (38.2 °C)] 98.8 °F (37.1 °C)  Pulse:  [82-97] 84  Resp:  [16-18] 18  SpO2:  [95 %-100 %] 98 %  BP: (114-130)/(58-70) 118/60     Body mass index is 29.05 kg/m².      Bladder Scan Volume (mL): 330 mL (06/06/19 0123)    Drains     Drain                 Urethral Catheter 06/06/19 0209 Non-latex 14 Fr. less than 1 day                Physical Exam   Nursing note and vitals reviewed.  Constitutional: She is oriented to person, place, and time. She appears well-developed and well-nourished. No distress.   Disheveled appearing female   HENT:   Head: Normocephalic and atraumatic.   Eyes: Pupils are equal, round, and reactive to light. Right eye exhibits no discharge. Left eye exhibits no discharge.   Neck: Normal range of motion. Neck supple.   Cardiovascular: Normal rate and intact distal pulses.    Pulmonary/Chest: Effort normal and breath sounds normal. No respiratory distress.   Abdominal: Soft. She exhibits distension. There is no tenderness.   Palpable bladder   Genitourinary:   Genitourinary Comments: Poor hygiene  Urethra meatus visible with narrow opening and dense scar tissue around meatus.   No palpable masses, limited exam due to patient positioning on stretcher    14 Fr silicone catheter placed   Musculoskeletal: Normal range of motion. She exhibits no edema.   Neurological: She is alert and oriented to person, place, and time. No  cranial nerve deficit.   Skin: Skin is warm and dry. She is not diaphoretic.     Psychiatric: She has a normal mood and affect. Her behavior is normal. Judgment and thought content normal.       Significant Labs:    BMP:  Recent Labs   Lab 06/05/19 2036      K 3.6      CO2 26   BUN 16   CREATININE 0.6   CALCIUM 9.5       CBC:  Recent Labs   Lab 06/05/19 2036   WBC 13.41*   HGB 13.6   HCT 40.8          Urine Culture: No results for input(s): LABURIN in the last 168 hours.  Urine Studies: No results for input(s): COLORU, APPEARANCEUA, PHUR, SPECGRAV, PROTEINUA, GLUCUA, KETONESU, BILIRUBINUA, OCCULTUA, NITRITE, UROBILINOGEN, LEUKOCYTESUR, RBCUA, WBCUA, BACTERIA, SQUAMEPITHEL, HYALINECASTS in the last 168 hours.    Invalid input(s): WRIGHTSUR    Significant Imaging:  All pertinent imaging results/findings from the past 24 hours have been reviewed.

## 2019-06-11 LAB
BACTERIA BLD CULT: NORMAL
BACTERIA BLD CULT: NORMAL

## 2019-06-13 ENCOUNTER — OFFICE VISIT (OUTPATIENT)
Dept: UROLOGY | Facility: CLINIC | Age: 54
End: 2019-06-13
Payer: MEDICARE

## 2019-06-13 VITALS
DIASTOLIC BLOOD PRESSURE: 72 MMHG | WEIGHT: 154.13 LBS | SYSTOLIC BLOOD PRESSURE: 110 MMHG | BODY MASS INDEX: 27.31 KG/M2 | HEART RATE: 84 BPM | HEIGHT: 63 IN

## 2019-06-13 DIAGNOSIS — R33.9 URINARY RETENTION: ICD-10-CM

## 2019-06-13 DIAGNOSIS — Z46.6 ENCOUNTER FOR FOLEY CATHETER REMOVAL: ICD-10-CM

## 2019-06-13 DIAGNOSIS — R31.0 GROSS HEMATURIA: Primary | ICD-10-CM

## 2019-06-13 PROCEDURE — 51700 PR IRRIGATION, BLADDER: ICD-10-PCS | Mod: S$GLB,,, | Performed by: NURSE PRACTITIONER

## 2019-06-13 PROCEDURE — 3008F BODY MASS INDEX DOCD: CPT | Mod: CPTII,S$GLB,, | Performed by: NURSE PRACTITIONER

## 2019-06-13 PROCEDURE — 99999 PR PBB SHADOW E&M-EST. PATIENT-LVL III: CPT | Mod: PBBFAC,,, | Performed by: NURSE PRACTITIONER

## 2019-06-13 PROCEDURE — 88112 CYTOLOGY SPECIMEN-URINE: ICD-10-PCS | Mod: 26,,, | Performed by: PATHOLOGY

## 2019-06-13 PROCEDURE — 3008F PR BODY MASS INDEX (BMI) DOCUMENTED: ICD-10-PCS | Mod: CPTII,S$GLB,, | Performed by: NURSE PRACTITIONER

## 2019-06-13 PROCEDURE — 88112 CYTOPATH CELL ENHANCE TECH: CPT | Performed by: PATHOLOGY

## 2019-06-13 PROCEDURE — 99204 OFFICE O/P NEW MOD 45 MIN: CPT | Mod: 25,S$GLB,, | Performed by: NURSE PRACTITIONER

## 2019-06-13 PROCEDURE — 87086 URINE CULTURE/COLONY COUNT: CPT

## 2019-06-13 PROCEDURE — 51700 IRRIGATION OF BLADDER: CPT | Mod: S$GLB,,, | Performed by: NURSE PRACTITIONER

## 2019-06-13 PROCEDURE — 99999 PR PBB SHADOW E&M-EST. PATIENT-LVL III: ICD-10-PCS | Mod: PBBFAC,,, | Performed by: NURSE PRACTITIONER

## 2019-06-13 PROCEDURE — 99204 PR OFFICE/OUTPT VISIT, NEW, LEVL IV, 45-59 MIN: ICD-10-PCS | Mod: 25,S$GLB,, | Performed by: NURSE PRACTITIONER

## 2019-06-13 NOTE — PROGRESS NOTES
"CHIEF COMPLAINT:    Mrs. Mello is a 54 y.o. female presenting for a consultation at the request of Dr. Abhijeet Blancas. Patient presents with taylor catheter removal s/p ED visit for acute urinary retention.    PRESENTING ILLNESS:    Ronda Mello is a 54 y.o. 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 H/o radiation tx , and chemo . Recently dx'd (19) recurrence of colon CA and due to have surgery.    Today pt presents to clinic for VT. Reports presented to ED on 19 for acute urination retention. Bladder scan revealed 330mL. Taylor catheter was placed. CT RSS no evidence of hydro or obstructing ureteral calculus. Mildly distended bladder w/ normal contour. Pt was rx'd keflex BID 500mg x7 days. Reports days leading up to ED visit, she had difficulty urinating w/ "drops" of urine and irregular stream, associated w/ urinary frequency and urgency. Denies UI/KARLA. Reports h/o intermittent GH since . Reports has cysto in 2016 that was "normal" at [facility]. Reports family h/o renal CA in brother ( at 60)and father ( at 78). She quit smoking in 2018, h/o smoking 1.5ppd x40+ yrs.    REVIEW OF SYSTEMS:    Ronda Mello denies headache, blurred vision, fever, nausea, vomiting, chills, abdominal pain, bleeding per rectum, cough, SOB, recent loss of consciousness, recent mental status changes, seizures, dizziness, or upper or lower extremity weakness.    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     Thyroid disease     Uterine cancer     adenocarcinoma ot uterus       Past Surgical History:   Procedure Laterality Date    CHOLECYSTECTOMY      COLONOSCOPY N/A 7/20/2017    [facility]    COLONOSCOPY N/A 10/15/2015    [facility]    EXCISION-LESION-EYELID LOWER  / RESECTION LOWER EYELID Right 1/15/2016    Performed [facility] [place]    EXTRACTION, CATARACT, WITH IOL INSERTION Right 4/16/2019    Performed by Yun Frank [facility] [place]    HYSTERECTOMY      total w/ removal of both ovaries    UMBILICAL HERNIA REPAIR         Family History   Problem Relation Age of Onset    Cancer Unknown     Arthritis Unknown     Heart disease Unknown        Social History     Socioeconomic History    Marital status: Single     Spouse name: Not on file    Number of children: Not on file    Years of education: Not on file    Highest education level: Not on file   Occupational History    Not on file   Social Needs    Financial resource strain: Not on file    Food insecurity:     Worry: Not on file     Inability: Not on file    Transportation needs:     Medical: Not on file     Non-medical: Not on file   Tobacco Use    Smoking status: Current Every Day Smoker     Packs/day: 1.00     Years: 35.00     Pack years: 35.00     Types: Cigarettes     Last attempt to quit: 12/9/2015     Years since quitting: 3.5    Smokeless tobacco: Never Used   Substance and Sexual Activity    Alcohol use: Yes     Alcohol/week: 0.0 oz     Comment: Admits to at least 2 beers daily but states "I am not an alcoholic"    Drug use: No    Sexual activity: Not on file   Lifestyle    Physical activity:     Days per week: Not on file     Minutes per session: Not on file    Stress: Not on file   Relationships "    Social connections:     Talks on phone: Not on file     Gets together: Not on file     Attends Restorationism service: Not on file     Active member of club or organization: Not on file     Attends meetings of clubs or organizations: Not on file     Relationship status: Not on file   Other Topics Concern    Not on file   Social History Narrative    Not on file       Allergies:  Norco [hydrocodone-acetaminophen]    Medications:    Current Outpatient Medications:     ADVAIR DISKUS 250-50 mcg/dose diskus inhaler, , Disp: , Rfl: 0    alendronate (FOSAMAX) 70 MG tablet, , Disp: , Rfl: 0    cephALEXin (KEFLEX) 500 MG capsule, Take 1 capsule (500 mg total) by mouth every 12 (twelve) hours. for 7 days, Disp: 14 capsule, Rfl: 0    cyclobenzaprine (FLEXERIL) 10 MG tablet, Take 10 mg by mouth 3 (three) times daily as needed for Muscle spasms., Disp: , Rfl:     naproxen (NAPROSYN) 500 MG tablet, Take 500 mg by mouth 2 (two) times daily., Disp: , Rfl:     PHYSICAL EXAMINATION:    The patient generally appears in good health, is appropriately interactive, and is in no apparent distress.     Eyes: anicteric sclerae, moist conjunctivae; no lid-lag; PERRLA     HENT: Atraumatic; oropharynx clear with moist mucous membranes and no mucosal ulcerations;normal hard and soft palate. No evidence of lymphadenopathy.    Neck: Trachea midline.  No thyromegaly.    Musculoskeletal: No abnormal gait.    Skin: No lesions.    Mental: Cooperative with normal affect.  Is oriented to time, place, and person.    Neuro: Grossly intact.    Chest: Normal inspiratory effort.   No accessory muscles.  No audible wheezes.  Respirations symmetric on inspiration and expiration.    Heart: Regular rhythm.      Abdomen:  Soft, non-tender. No masses or organomegaly. Bladder is not palpable. No evidence of flank discomfort. No evidence of inguinal hernia.    Extremities: No clubbing, cyanosis, or edema.    LABS:    Lab Results   Component Value Date     CREATININE 0.6 06/05/2019     No results found for: HGBA1C    IMPRESSION:    Encounter Diagnoses   Name Primary?    Gross hematuria Yes    Urinary retention     Encounter for Taylor catheter removal      PLAN:    I spent 45 minutes with the patient of which more than half was spent in direct consultation with the patient in regards to our treatment and plan.    Discussed and reviewed voiding trial process and procedure.   Voiding trial performed by Nurse Rausch.  150 ml of sterile water was instilled into bladder.  Taylor catheter was removed. Patient urinated  180 ml without difficulty.  Patient was instructed to drink plenty of fluids today.  Instructed patient to call to give an update on urine output.  Informed patient to return to clinic or emergency department (if after clinic hours) to have taylor catheter put back in if unable to urinate within 5-8 hours of taylor catheter removal or starts to experience bladder pressure/pain, decrease flow, straining/difficulty urinating.    Discussed and reviewed hematuria, potential etiologies for blood found in the urine, including but not limited kidney stones, infection, inflammation, neoplasm. Discussed and recommend hematuria evaluation with urine cytology, cystoscopy, and CTU. Reviewed procedures.    Pt will call to schedule cysto and CTU, as she is scheduled to have colorectal surgery. Once she has those dates she will call clinic.    Education sheets provided.    F/u w/ cysto and CTU as discussed.    Patient verbalized and expressed understanding, and agree w/ plan.

## 2019-06-13 NOTE — PATIENT INSTRUCTIONS
Blood in the Urine    Blood in the urine (hematuria) has many possible causes. If it occurs after an injury (such as a car accident or fall), it is most often a sign of bruising to the kidney or bladder. Common causes of blood in the urine include urinary tract infections, kidney stones, inflammation, tumors, or certain other diseases of the kidney or bladder. Menstruation can cause blood to appear in the urine sample, although it is not coming from the urinary tract.  If only a trace amount of blood is present, it will show up on the urine test, even though the urine may be yellow and not pink or red. This may occur with any of the above conditions, as well as heavy exercise or high fever. In this case, your doctor may want to repeat the urine test on another day. This will show if the blood is still present. If it is, then other tests can be done to find out the cause.  Home care  Follow these home care guidelines:  · If your urine does not appear bloody (pink, brown or red) then you do not need to restrict your activity in any way.  · If you can see blood in your urine, rest and avoid heavy exertion until your next exam. Do not use aspirin, blood thinners, or anti-platelet or anti-inflammatory medicines. These include ibuprofen and naproxen. These thin the blood and may increase bleeding.  Follow-up care  Follow up with your healthcare provider, or as advised. If you were injured and had blood in your urine, you should have a repeat urine test in 1 to 2 days. Contact your doctor for this test.  A radiologist will review any X-rays that were taken. You will be told of any new findings that may affect your care.  When to seek medical advice  Call your healthcare provider right away if any of these occur:  · Bright red blood or blood clots in the urine (if you did not have this before)  · Weakness, dizziness or fainting  · New groin, abdominal, or back pain  · Fever of 100.4ºF (38ºC) or higher, or as directed by  your healthcare provider  · Repeated vomiting  · Bleeding from the nose or gums or easy bruising  Date Last Reviewed: 9/1/2016  © 4267-3557 [employer]. [address] [place] [address], [place] [address]. All rights reserved. This information is not intended as a substitute for professional medical care. Always follow your healthcare professional's instructions.

## 2019-06-14 LAB
BACTERIA UR CULT: NORMAL
BACTERIA UR CULT: NORMAL

## 2019-06-20 ENCOUNTER — TELEPHONE (OUTPATIENT)
Dept: UROLOGY | Facility: CLINIC | Age: 54
End: 2019-06-20

## 2020-07-27 ENCOUNTER — TELEPHONE (OUTPATIENT)
Dept: NEUROLOGY | Facility: HOSPITAL | Age: 55
End: 2020-07-27

## 2020-07-28 ENCOUNTER — TELEPHONE (OUTPATIENT)
Dept: NEUROLOGY | Facility: HOSPITAL | Age: 55
End: 2020-07-28

## 2020-08-12 ENCOUNTER — OFFICE VISIT (OUTPATIENT)
Dept: NEUROLOGY | Facility: HOSPITAL | Age: 55
End: 2020-08-12
Attending: INTERNAL MEDICINE
Payer: MEDICARE

## 2020-08-12 ENCOUNTER — LAB VISIT (OUTPATIENT)
Dept: LAB | Facility: HOSPITAL | Age: 55
End: 2020-08-12
Attending: INTERNAL MEDICINE
Payer: MEDICARE

## 2020-08-12 VITALS
TEMPERATURE: 100 F | BODY MASS INDEX: 29.28 KG/M2 | WEIGHT: 165.25 LBS | HEIGHT: 63 IN | SYSTOLIC BLOOD PRESSURE: 125 MMHG | HEART RATE: 87 BPM | DIASTOLIC BLOOD PRESSURE: 79 MMHG

## 2020-08-12 DIAGNOSIS — B18.2 CHRONIC HEPATITIS C WITHOUT HEPATIC COMA: ICD-10-CM

## 2020-08-12 DIAGNOSIS — B18.2 CHRONIC HEPATITIS C WITHOUT HEPATIC COMA: Primary | ICD-10-CM

## 2020-08-12 LAB
ALBUMIN SERPL BCP-MCNC: 3.4 G/DL (ref 3.5–5.2)
ALP SERPL-CCNC: 76 U/L (ref 55–135)
ALT SERPL W/O P-5'-P-CCNC: 31 U/L (ref 10–44)
ANION GAP SERPL CALC-SCNC: 7 MMOL/L (ref 8–16)
AST SERPL-CCNC: 32 U/L (ref 10–40)
BASOPHILS # BLD AUTO: 0.03 K/UL (ref 0–0.2)
BASOPHILS NFR BLD: 0.5 % (ref 0–1.9)
BILIRUB SERPL-MCNC: 0.3 MG/DL (ref 0.1–1)
BUN SERPL-MCNC: 15 MG/DL (ref 6–20)
CALCIUM SERPL-MCNC: 9 MG/DL (ref 8.7–10.5)
CHLORIDE SERPL-SCNC: 107 MMOL/L (ref 95–110)
CO2 SERPL-SCNC: 28 MMOL/L (ref 23–29)
CREAT SERPL-MCNC: 0.6 MG/DL (ref 0.5–1.4)
DIFFERENTIAL METHOD: NORMAL
EOSINOPHIL # BLD AUTO: 0.2 K/UL (ref 0–0.5)
EOSINOPHIL NFR BLD: 3.8 % (ref 0–8)
ERYTHROCYTE [DISTWIDTH] IN BLOOD BY AUTOMATED COUNT: 12.2 % (ref 11.5–14.5)
EST. GFR  (AFRICAN AMERICAN): >60 ML/MIN/1.73 M^2
EST. GFR  (NON AFRICAN AMERICAN): >60 ML/MIN/1.73 M^2
GLUCOSE SERPL-MCNC: 99 MG/DL (ref 70–110)
HCT VFR BLD AUTO: 41.5 % (ref 37–48.5)
HGB BLD-MCNC: 13.5 G/DL (ref 12–16)
IMM GRANULOCYTES # BLD AUTO: 0.01 K/UL (ref 0–0.04)
IMM GRANULOCYTES NFR BLD AUTO: 0.2 % (ref 0–0.5)
INR PPP: 1 (ref 0.8–1.2)
LYMPHOCYTES # BLD AUTO: 2.6 K/UL (ref 1–4.8)
LYMPHOCYTES NFR BLD: 46 % (ref 18–48)
MCH RBC QN AUTO: 29.3 PG (ref 27–31)
MCHC RBC AUTO-ENTMCNC: 32.5 G/DL (ref 32–36)
MCV RBC AUTO: 90 FL (ref 82–98)
MONOCYTES # BLD AUTO: 0.4 K/UL (ref 0.3–1)
MONOCYTES NFR BLD: 7 % (ref 4–15)
NEUTROPHILS # BLD AUTO: 2.4 K/UL (ref 1.8–7.7)
NEUTROPHILS NFR BLD: 42.5 % (ref 38–73)
NRBC BLD-RTO: 0 /100 WBC
PLATELET # BLD AUTO: 242 K/UL (ref 150–350)
PMV BLD AUTO: 9.4 FL (ref 9.2–12.9)
POTASSIUM SERPL-SCNC: 3.4 MMOL/L (ref 3.5–5.1)
PROT SERPL-MCNC: 6.9 G/DL (ref 6–8.4)
PROTHROMBIN TIME: 10.7 SEC (ref 9–12.5)
RBC # BLD AUTO: 4.61 M/UL (ref 4–5.4)
SODIUM SERPL-SCNC: 142 MMOL/L (ref 136–145)
WBC # BLD AUTO: 5.54 K/UL (ref 3.9–12.7)

## 2020-08-12 PROCEDURE — 80053 COMPREHEN METABOLIC PANEL: CPT

## 2020-08-12 PROCEDURE — 86704 HEP B CORE ANTIBODY TOTAL: CPT

## 2020-08-12 PROCEDURE — 36415 COLL VENOUS BLD VENIPUNCTURE: CPT

## 2020-08-12 PROCEDURE — 86706 HEP B SURFACE ANTIBODY: CPT

## 2020-08-12 PROCEDURE — 87521 HEPATITIS C PROBE&RVRS TRNSC: CPT

## 2020-08-12 PROCEDURE — 86703 HIV-1/HIV-2 1 RESULT ANTBDY: CPT

## 2020-08-12 PROCEDURE — 99214 OFFICE O/P EST MOD 30 MIN: CPT | Performed by: INTERNAL MEDICINE

## 2020-08-12 PROCEDURE — 85610 PROTHROMBIN TIME: CPT

## 2020-08-12 PROCEDURE — 87340 HEPATITIS B SURFACE AG IA: CPT

## 2020-08-12 PROCEDURE — 81596 NFCT DS CHRNC HCV 6 ASSAYS: CPT

## 2020-08-12 PROCEDURE — 85025 COMPLETE CBC W/AUTO DIFF WBC: CPT

## 2020-08-12 NOTE — PROGRESS NOTES
[facility]    CC: Hepatitis C    HPI 55 y.o. female presents with chronic, untreated, non-cirrhotic, genotype 1a Hepatitis C (former history of IVDU); not associated with encephalopathy. She reports feeling well today and interested in treatment. Had followed with [facility] however recently moved to [place] and would like to follow here. 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 Followed with colorectal surgery at [facility], advised next colonoscopy due Jan 2021 (last in January)    Chart reviewed for collateral medical history summarized and included here.    Chart reviewed and summarized here.    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     Thyroid disease     Uterine cancer     adenocarcinoma ot uterus       Past Surgical History  Past Surgical History:   Procedure Laterality Date    CATARACT EXTRACTION W/  INTRAOCULAR LENS IMPLANT Right 4/16/2019    Procedure: EXTRACTION, CATARACT, WITH IOL INSERTION;  Surgeon: Yun Frank MD;  Location: [facility] OR;  Service: Ophthalmology;  Laterality: Right;  TOPICAL    CHOLECYSTECTOMY      COLONOSCOPY N/A 10/15/2015    Procedure: COLONOSCOPY;  Surgeon: Abhijeet Dickey MD;  Location: [facility] ENDO;  Service: Endoscopy;  Laterality: N/A;    COLONOSCOPY N/A 7/20/2017    Procedure: COLONOSCOPY;  Surgeon: Abhijeet Diceky MD;   "Location: [facility] ENDO;  Service: Endoscopy;  Laterality: N/A;    HYSTERECTOMY      total w/ removal of both ovaries    UMBILICAL HERNIA REPAIR         Social History  Social History     Tobacco Use    Smoking status: Current Every Day Smoker     Packs/day: 1.00     Years: 35.00     Pack years: 35.00     Types: Cigarettes     Last attempt to quit: 2015     Years since quittin.6    Smokeless tobacco: Never Used   Substance Use Topics    Alcohol use: Yes     Alcohol/week: 0.0 standard drinks     Comment: Admits to at least 2 beers daily but states "I am not an alcoholic"    Drug use: No       Family History  Mother with colon cancer    Review of Systems  General ROS: negative for chills, fever or weight loss  Psychological ROS: negative for hallucination, depression or suicidal ideation  Ophthalmic ROS: negative for blurry vision, photophobia or eye pain  ENT ROS: negative for epistaxis, sore throat or rhinorrhea  Respiratory ROS: no cough, shortness of breath, or wheezing  Cardiovascular ROS: no chest pain or dyspnea on exertion  Gastrointestinal ROS: no abdominal pain, change in bowel habits, or black/ bloody stools  Genito-Urinary ROS: no dysuria, trouble voiding, or hematuria  Musculoskeletal ROS: negative for gait disturbance or muscular weakness  Neurological ROS: no syncope or seizures; no ataxia  Dermatological ROS: negative for pruritis, rash and jaundice    Physical Examination  /79 (BP Location: Left arm, Patient Position: Sitting, BP Method: Medium (Automatic))   Pulse 87   Temp 99.6 °F (37.6 °C) (Oral)   Ht 5' 3" (1.6 m)   Wt 75 kg (165 lb 3.8 oz)   BMI 29.27 kg/m²   General appearance: alert, cooperative, no distress  HENT: Normocephalic, atraumatic, neck symmetrical, no nasal discharge   Eyes: conjunctivae/corneas clear, PERRL, EOM's intact  Lungs: clear to auscultation bilaterally, no dullness to percussion bilaterally  Heart: regular rate and rhythm without rub; no displacement " of the PMI   Abdomen: soft, non-tender; bowel sounds normoactive; no organomegaly  Extremities: extremities symmetric; no clubbing, cyanosis, or edema  Integument: Skin color, texture, turgor normal; no rashes; hair distrubution normal  Neurologic: Alert and oriented X 3, normal strength, normal coordination and gait  Psychiatric: no pressured speech; normal affect; no evidence of impaired cognition     Labs:  Lab Results   Component Value Date    ALT 23 2019    AST 31 2019    GGT 32 2016    ALKPHOS 65 2019    BILITOT 0.4 2019     Lab Results   Component Value Date    HGB 13.6 2019     2019    INR 1.2 2010    ALBUMIN 3.2 (L) 2019       Imagin2019 CT renal reviewed independently; with mild atherosclerotic changes and gastric/duodenal post-operative changes.    Assessment:   55 y.o. female patient with histories of   - Chronic, untreated, non-cirrhotic Hepatitis C. Last fibrosure in  F0  - Rebollar syndrome, followed at [facility] but reestablishing here after move    Plan:   - Pre-treatment HCV labs today, then plan treatment  - Due for next colonoscopy in January by report  - Maintain regular Gyn, Nephrology followups as well given Rebollar and history of Gyn and GI malignancy  -0     Abhijeet Dickey MD   [address], [address]   [place] [id number] [phone]

## 2020-08-13 LAB
HBV SURFACE AB SER-ACNC: NEGATIVE M[IU]/ML
HBV SURFACE AG SERPL QL IA: NEGATIVE
HIV 1+2 AB+HIV1 P24 AG SERPL QL IA: NEGATIVE

## 2020-08-14 LAB — HBV CORE AB SERPL QL IA: NEGATIVE

## 2020-08-17 LAB — HCV RNA SERPL QL NAA+PROBE: DETECTED

## 2020-08-18 LAB
A2 MACROGLOB SERPL-MCNC: 349 MG/DL (ref 100–280)
ALT SERPL W P-5'-P-CCNC: 23 U/L (ref 7–45)
APO A-I SERPL-MCNC: 138 MG/DL
BILIRUB SERPL-MCNC: 0.2 MG/DL
BIOPREDICTIVE SERIAL NUMBER: ABNORMAL
FIBROSIS STAGE SERPL QL: ABNORMAL
FIBROTEST INTERPRETATION: ABNORMAL
FIBROTEST-ACTITEST COMMENT: ABNORMAL
GGT SERPL-CCNC: 30 U/L (ref 5–36)
HAPTOGLOB SERPL-MCNC: 162 MG/DL (ref 30–200)
LIVER FIBR SCORE SERPL CALC.FIBROSURE: 0.26
NECROINFLAMMAT INTERP: ABNORMAL
NECROINFLAMMATORY ACT GRADE SERPL QL: ABNORMAL
NECROINFLAMMATORY ACT SCORE SERPL: 0.09

## 2020-08-24 ENCOUNTER — TELEPHONE (OUTPATIENT)
Dept: NEUROLOGY | Facility: HOSPITAL | Age: 55
End: 2020-08-24

## 2020-08-24 NOTE — TELEPHONE ENCOUNTER
Called to review labs. Plan treatment for Hepatitis C with Epclusa, however she is checking to ensure she was not sent another form of treatment by her other and/or previous physicians and will call us back. Fibrosure F0-F1. Otherwise non-infected/non-immune to Hep B and should seek routine vaccination with her PCP as well. If she does not have existing treatment plan, will plan Epclusa x 12 weeks with 4-week labs and 12-week-post treatment labs.

## 2020-11-24 ENCOUNTER — TELEPHONE (OUTPATIENT)
Dept: NEUROLOGY | Facility: HOSPITAL | Age: 55
End: 2020-11-24

## 2020-11-24 NOTE — TELEPHONE ENCOUNTER
----- Message from Veronica Enriquez sent at 11/24/2020 11:32 AM CST -----  Contact: self [phone]  GI - Patient is calling to schedule a appointment for a colonoscopy. Please call

## 2020-11-24 NOTE — TELEPHONE ENCOUNTER
Pt requesting gi clinic appt for follow up to hep b and colonoscopy.  Appt scheduled with pt on Wednesday, December 2, 2020 at 230pm.  Pt stated clinic address correctly.

## 2020-12-02 ENCOUNTER — TELEPHONE (OUTPATIENT)
Dept: NEUROLOGY | Facility: HOSPITAL | Age: 55
End: 2020-12-02

## 2020-12-02 NOTE — TELEPHONE ENCOUNTER
----- Message from Izzy Jackman sent at 12/2/2020  9:42 AM CST -----  Regarding: reschedule  Contact: [phone]/self  Patient is requesting a call back regarding rescheduling her appointment for today  Would the patient rather a call back or a response via [facility]?  call  Best Call Back Number:  [phone]/self  Additional Information:

## 2020-12-02 NOTE — TELEPHONE ENCOUNTER
Appt rescheduled with pt on Wednesday, December 9, 2020 at 145pm.  Pt repeated date and time correctly.

## 2020-12-09 ENCOUNTER — OFFICE VISIT (OUTPATIENT)
Dept: NEUROLOGY | Facility: HOSPITAL | Age: 55
End: 2020-12-09
Attending: INTERNAL MEDICINE
Payer: MEDICARE

## 2020-12-09 ENCOUNTER — SPECIALTY PHARMACY (OUTPATIENT)
Dept: PHARMACY | Facility: CLINIC | Age: 55
End: 2020-12-09

## 2020-12-09 VITALS
HEIGHT: 63 IN | DIASTOLIC BLOOD PRESSURE: 71 MMHG | RESPIRATION RATE: 16 BRPM | SYSTOLIC BLOOD PRESSURE: 125 MMHG | BODY MASS INDEX: 28.93 KG/M2 | TEMPERATURE: 98 F | HEART RATE: 87 BPM | WEIGHT: 163.25 LBS

## 2020-12-09 DIAGNOSIS — Z03.818 ENCOUNTER FOR OBSERVATION FOR SUSPECTED EXPOSURE TO OTHER BIOLOGICAL AGENTS RULED OUT: ICD-10-CM

## 2020-12-09 DIAGNOSIS — Z86.010 HISTORY OF COLON POLYPS: Primary | ICD-10-CM

## 2020-12-09 PROCEDURE — 99214 OFFICE O/P EST MOD 30 MIN: CPT | Performed by: INTERNAL MEDICINE

## 2020-12-09 RX ORDER — SODIUM, POTASSIUM,MAG SULFATES 17.5-3.13G
1 SOLUTION, RECONSTITUTED, ORAL ORAL ONCE
Qty: 1 BOTTLE | Refills: 0 | Status: SHIPPED | OUTPATIENT
Start: 2020-12-09 | End: 2020-12-09

## 2020-12-09 RX ORDER — AMLODIPINE BESYLATE 5 MG/1
5 TABLET ORAL DAILY
COMMUNITY
Start: 2020-11-16

## 2020-12-09 RX ORDER — MIRABEGRON 50 MG/1
TABLET, FILM COATED, EXTENDED RELEASE ORAL
COMMUNITY
Start: 2020-11-16 | End: 2021-01-14

## 2020-12-09 RX ORDER — VELPATASVIR AND SOFOSBUVIR 100; 400 MG/1; MG/1
1 TABLET, FILM COATED ORAL DAILY
Qty: 28 TABLET | Refills: 2 | Status: SHIPPED | OUTPATIENT
Start: 2020-12-09 | End: 2021-04-20

## 2020-12-09 RX ORDER — ARIPIPRAZOLE 300 MG
KIT INTRAMUSCULAR 2 TIMES DAILY
COMMUNITY
Start: 2020-09-08

## 2020-12-09 RX ORDER — BUSPIRONE HYDROCHLORIDE 15 MG/1
15 TABLET ORAL 3 TIMES DAILY
COMMUNITY
Start: 2020-11-16

## 2020-12-09 RX ORDER — TIOTROPIUM BROMIDE AND OLODATEROL 3.124; 2.736 UG/1; UG/1
SPRAY, METERED RESPIRATORY (INHALATION)
COMMUNITY
Start: 2020-11-16 | End: 2020-12-09 | Stop reason: SDUPTHER

## 2020-12-09 NOTE — PROGRESS NOTES
"[facility]    CC: Family history of colon polyps & Chronic Hepatitis C,     HPI 55 y.o. female who presents with chronic, untreated, non-cirrhotic genotype 1a Hepatitis C (IVDU history); not associated with encephalopathy. She was seen on 8/17 and received Hep C labs but never received treatment. She is still interested in treatment and would like to begin as soon as possible. She also reports a family history of Rebollar syndrome (Mother had colon cancer) with a history of uterine cancer s/p ANJANA and duodenal adenocarcinoma s/o Whipple--followed by colorectal surgery at [facility]. Had last colonoscopy January 2020 and is due for another. She reports no rashes, abdominal pain, hematochezia, weight loss, fatigue, fever/chills, nausea/vomitings, chest pain/shortness of breath.    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     Thyroid disease     Uterine cancer     adenocarcinoma ot uterus         Review of Systems  General ROS: negative for chills, fever or weight loss  Cardiovascular ROS: no chest pain or dyspnea on exertion  Gastrointestinal ROS: no abdominal pain, change in bowel habits, or black/ bloody stools    Physical Examination  /71 (BP Location: Left arm, Patient Position: Sitting, BP Method: Medium (Automatic))   Pulse 87   Temp 98.3 °F (36.8 °C) (Oral)   Resp 16   Ht 5' 3" (1.6 m)   Wt 74 kg (163 lb 4 oz)   BMI 28.92 kg/m²   General appearance: alert, cooperative, no distress  HENT: Normocephalic, atraumatic, neck " symmetrical, no nasal discharge   Lungs: clear to auscultation bilaterally, no dullness to percussion bilaterally, expiratory wheeze in all zones.  Heart: regular rate and rhythm without rub; no displacement of the PMI   Abdomen: soft, non-tender; bowel sounds normoactive; no organomegaly; surgical scars present   Extremities: extremities symmetric; no clubbing, cyanosis, or edema  Neurologic: Alert and oriented X 3, normal strength, normal coordination and gait    Labs:    8/12  HIV 1/2 ag/ab: negative  Hep B core/surface ab: negative   Hep B surface antigen: negative  Hep C RNA: Detected  INR: 1.0  Albumin: 3.4  AST: 32  ALT: 31  Alkaline  Phosphatase: 76  Fibrosure: no fibrosis, stage F0-F1    Imaging:  No new imaging    Assessment:   55 y.o. female patient with histories of   - Chronic, untreated, non-cirrhotic Hepatitis C. Last fibrosure in 2020 F0  - Rebollar syndrome, followed at [facility] but reestablishing here after move.    Plan:   - Start Hep C treatment 400-100mg Sofosbuvir-velpatasvir daily for 12 weeks  - Repeat colonoscopy scheduled for February  - Maintain regular Gyn, Nephrology followups as well given Rebollar and history of Gyn and GI malignancy    Abhijeet Dickey MD   [address], [address]   [place] [address] [phone]

## 2020-12-09 NOTE — PATIENT INSTRUCTIONS
Covid 19 test scheduled with pt on Monday, February 8, 2021 at 9am at [facility].    SUPREP Instructions    [facility] [address]  Clinic Office [phone]  Endoscopy Lab [phone]    You are scheduled for a Colonoscopy with Dr. Dickey on Thursday, February 11, 2021 at [facility].  You will check in at the Information desk on the first floor of the hospital. Please contact the office to reschedule if needed at     Do not follow instructions listed in the box.     A responsible adult (family member or friend) must come with you and transport you home.  You are not allowed to drive, take a taxi/ride share or bus or leave the Endoscopy Center alone.  If you do not have a responsible adult with you to take you home, your exam will be cancelled.      Please follow instructions of  if you are taking anticoagulant/blood thinning medications such as Aggrenox, Brilinta, Effient, Eliquis, Lovenox, Plavix, Pletal, Pradaxa, Ticilid, Xarelto or Coumadin.      Please skip your morning dose of insulin or other oral medications for diabetes the morning of the procedure unless instructed otherwise by your doctor. You should take your blood pressure, heart, anti-rejection and or seizure medication the morning of your procedure.     To ensure that your test is accurate and complete, you must follow these instructions - please do not use the instructions provided from the pharmacy.     For your safety and the safety of the providers and staff, You will be required to have a screening Covid test 72 hours before procedure.    Do not follow instructions listed in the box.      The Day Before The Procedure:     Follow a CLEAR LIQUID DIET for the entire day before your scheduled colonoscopy.  This means no solid food the entire day.   A clear liquid diet includes the following:  o Water, Coffee or decaffeinated coffee (no milk or cream)  o Tea, Herbal  tea  o Carbonated beverages (soft drinks), regular and sugar free  o Gelatin  o Apple Juice, white grape juice, white cranberry juice  o Gatorade, Power Aid, Crystal Light, Hernan Aid (Yellow, Green, Clear)  o Lemonade and Limeade  o Bouillon, clear consomme'  o Snowball, popsicles  (Yellow, Green, Clear)    DO NOT DRINK ANY LIQUIDS CONTAINING RED DYE  DO NOT DRINK ANY LIQUIDS NOT SPECIFICALLY LISTED  DO NOT DRINK ALCOHOL     At 5 pm the evening before your colonoscopy:  o Pour one (1) bottle of SUPREP into the container provided in the box. Add water to the line on the container and mix well.  Drink the whole container and then drink 2 more containers of water over 1 hour.  - This is sometimes easier to drink if this solution is cold, so you can mix the solution 20 minutes ahead of time and place in the refrigerator prior to drinking.  You have to drink the solution within 30-45 minutes of mixing it.  Do NOT put this solution over ice.  It IS ok to drink with a straw.    The Day of the Procedure - The Endoscopy department will call you 2 days prior to your procedure to give you the exact time     5 hours prior to your ARRIVAL TIME (this may be in the middle of the night)  o Pour the 2nd bottle of SUPREP into the container provided in the box.  Add water to the line on the container and mix well.  Drink the whole container and then drink 2 more containers of water over 1 hour.    o AFTER YOU HAVE COMPLETED YOUR PREP, YOU MAY HAVE NOTHING ELSE BY MOUTH    o Please leave all valuables and jewelry at home.    o At 600 am, you may take the last dose of any medications you are allowed to take with a small sip of water (blood pressure, heart, anti-rejection and or seizure medication).  If you use inhalers bring them with you.    o You may call the Endoscopy department at [phone] with any questions regarding your procedure.

## 2020-12-16 NOTE — TELEPHONE ENCOUNTER
Epclusa x 12 weeks approved. [place] [id number].  Approval dates: 12/16/2020 to 03/09/2021    Will complete BI.

## 2021-01-04 ENCOUNTER — HOSPITAL ENCOUNTER (EMERGENCY)
Facility: HOSPITAL | Age: 56
Discharge: HOME | End: 2021-01-04
Attending: EMERGENCY MEDICINE
Payer: MEDICARE

## 2021-01-04 VITALS
DIASTOLIC BLOOD PRESSURE: 84 MMHG | WEIGHT: 166 LBS | BODY MASS INDEX: 28.34 KG/M2 | OXYGEN SATURATION: 97 % | HEIGHT: 64 IN | TEMPERATURE: 98 F | RESPIRATION RATE: 20 BRPM | SYSTOLIC BLOOD PRESSURE: 135 MMHG | HEART RATE: 97 BPM

## 2021-01-04 DIAGNOSIS — N30.00 ACUTE CYSTITIS WITHOUT HEMATURIA: Primary | ICD-10-CM

## 2021-01-04 LAB
BACTERIA #/AREA URNS HPF: ABNORMAL /HPF
BILIRUB UR QL STRIP: ABNORMAL
CLARITY UR: ABNORMAL
COLOR UR: ABNORMAL
GLUCOSE UR QL STRIP: NEGATIVE
HGB UR QL STRIP: ABNORMAL
KETONES UR QL STRIP: ABNORMAL
LEUKOCYTE ESTERASE UR QL STRIP: NEGATIVE
MICROSCOPIC COMMENT: ABNORMAL
NITRITE UR QL STRIP: POSITIVE
PH UR STRIP: 6 [PH] (ref 5–8)
PROT UR QL STRIP: ABNORMAL
RBC #/AREA URNS HPF: 2 /HPF (ref 0–4)
SP GR UR STRIP: 1.02 (ref 1–1.03)
URN SPEC COLLECT METH UR: ABNORMAL
UROBILINOGEN UR STRIP-ACNC: ABNORMAL EU/DL
WBC #/AREA URNS HPF: 5 /HPF (ref 0–5)

## 2021-01-04 PROCEDURE — 81000 URINALYSIS NONAUTO W/SCOPE: CPT

## 2021-01-04 PROCEDURE — 99284 EMERGENCY DEPT VISIT MOD MDM: CPT

## 2021-01-04 RX ORDER — PHENAZOPYRIDINE HYDROCHLORIDE 200 MG/1
200 TABLET, FILM COATED ORAL 3 TIMES DAILY
Qty: 6 TABLET | Refills: 0 | Status: SHIPPED | OUTPATIENT
Start: 2021-01-04 | End: 2021-01-06

## 2021-01-04 RX ORDER — NITROFURANTOIN 25; 75 MG/1; MG/1
100 CAPSULE ORAL 2 TIMES DAILY
Qty: 10 CAPSULE | Refills: 0 | Status: SHIPPED | OUTPATIENT
Start: 2021-01-04 | End: 2021-01-09

## 2021-01-14 ENCOUNTER — SPECIALTY PHARMACY (OUTPATIENT)
Dept: PHARMACY | Facility: CLINIC | Age: 56
End: 2021-01-14

## 2021-01-29 ENCOUNTER — SPECIALTY PHARMACY (OUTPATIENT)
Dept: PHARMACY | Facility: CLINIC | Age: 56
End: 2021-01-29

## 2021-02-08 ENCOUNTER — LAB VISIT (OUTPATIENT)
Dept: FAMILY MEDICINE | Facility: CLINIC | Age: 56
End: 2021-02-08
Payer: MEDICARE

## 2021-02-08 DIAGNOSIS — Z03.818 ENCOUNTER FOR OBSERVATION FOR SUSPECTED EXPOSURE TO OTHER BIOLOGICAL AGENTS RULED OUT: ICD-10-CM

## 2021-02-08 PROCEDURE — U0003 INFECTIOUS AGENT DETECTION BY NUCLEIC ACID (DNA OR RNA); SEVERE ACUTE RESPIRATORY SYNDROME CORONAVIRUS 2 (SARS-COV-2) (CORONAVIRUS DISEASE [COVID-19]), AMPLIFIED PROBE TECHNIQUE, MAKING USE OF HIGH THROUGHPUT TECHNOLOGIES AS DESCRIBED BY CMS-2020-01-R: HCPCS

## 2021-02-08 PROCEDURE — U0005 INFEC AGEN DETEC AMPLI PROBE: HCPCS

## 2021-02-09 ENCOUNTER — TELEPHONE (OUTPATIENT)
Dept: ENDOSCOPY | Facility: HOSPITAL | Age: 56
End: 2021-02-09

## 2021-02-09 LAB — SARS-COV-2 RNA RESP QL NAA+PROBE: NOT DETECTED

## 2021-02-10 ENCOUNTER — TELEPHONE (OUTPATIENT)
Dept: ENDOSCOPY | Facility: HOSPITAL | Age: 56
End: 2021-02-10

## 2021-02-11 ENCOUNTER — ANESTHESIA (OUTPATIENT)
Dept: ENDOSCOPY | Facility: HOSPITAL | Age: 56
End: 2021-02-11
Payer: MEDICARE

## 2021-02-11 ENCOUNTER — HOSPITAL ENCOUNTER (OUTPATIENT)
Facility: HOSPITAL | Age: 56
Discharge: HOME | End: 2021-02-11
Attending: INTERNAL MEDICINE | Admitting: INTERNAL MEDICINE
Payer: MEDICARE

## 2021-02-11 ENCOUNTER — ANESTHESIA EVENT (OUTPATIENT)
Dept: ENDOSCOPY | Facility: HOSPITAL | Age: 56
End: 2021-02-11
Payer: MEDICARE

## 2021-02-11 VITALS
HEIGHT: 63 IN | SYSTOLIC BLOOD PRESSURE: 120 MMHG | BODY MASS INDEX: 29.41 KG/M2 | OXYGEN SATURATION: 98 % | HEART RATE: 76 BPM | RESPIRATION RATE: 16 BRPM | WEIGHT: 166 LBS | TEMPERATURE: 98 F | DIASTOLIC BLOOD PRESSURE: 66 MMHG

## 2021-02-11 DIAGNOSIS — Z86.010 HISTORY OF COLON POLYPS: ICD-10-CM

## 2021-02-11 DIAGNOSIS — Z15.09 LYNCH SYNDROME: Primary | ICD-10-CM

## 2021-02-11 DIAGNOSIS — Z83.719 FAMILY HISTORY OF COLONIC POLYPS: ICD-10-CM

## 2021-02-11 PROCEDURE — G0105 COLORECTAL SCRN; HI RISK IND: HCPCS | Performed by: INTERNAL MEDICINE

## 2021-02-11 PROCEDURE — 37000009 HC ANESTHESIA EA ADD 15 MINS: Performed by: INTERNAL MEDICINE

## 2021-02-11 PROCEDURE — 25000003 PHARM REV CODE 250: Performed by: INTERNAL MEDICINE

## 2021-02-11 PROCEDURE — 25000003 PHARM REV CODE 250: Performed by: NURSE ANESTHETIST, CERTIFIED REGISTERED

## 2021-02-11 PROCEDURE — 63600175 PHARM REV CODE 636 W HCPCS: Performed by: NURSE ANESTHETIST, CERTIFIED REGISTERED

## 2021-02-11 PROCEDURE — 37000008 HC ANESTHESIA 1ST 15 MINUTES: Performed by: INTERNAL MEDICINE

## 2021-02-11 RX ORDER — SODIUM CHLORIDE 0.9 % (FLUSH) 0.9 %
10 SYRINGE (ML) INJECTION
Status: DISCONTINUED | OUTPATIENT
Start: 2021-02-11 | End: 2021-02-11 | Stop reason: HOSPADM

## 2021-02-11 RX ORDER — PROPOFOL 10 MG/ML
VIAL (ML) INTRAVENOUS
Status: DISCONTINUED | OUTPATIENT
Start: 2021-02-11 | End: 2021-02-11

## 2021-02-11 RX ORDER — SODIUM CHLORIDE 9 MG/ML
INJECTION, SOLUTION INTRAVENOUS CONTINUOUS
Status: DISCONTINUED | OUTPATIENT
Start: 2021-02-11 | End: 2021-02-11 | Stop reason: HOSPADM

## 2021-02-11 RX ORDER — LIDOCAINE HYDROCHLORIDE 20 MG/ML
INJECTION INTRAVENOUS
Status: DISCONTINUED | OUTPATIENT
Start: 2021-02-11 | End: 2021-02-11

## 2021-02-11 RX ORDER — PROPOFOL 10 MG/ML
VIAL (ML) INTRAVENOUS CONTINUOUS PRN
Status: DISCONTINUED | OUTPATIENT
Start: 2021-02-11 | End: 2021-02-11

## 2021-02-11 RX ADMIN — LIDOCAINE HYDROCHLORIDE 100 MG: 20 INJECTION, SOLUTION INTRAVENOUS at 10:02

## 2021-02-11 RX ADMIN — PROPOFOL 150 MCG/KG/MIN: 10 INJECTION, EMULSION INTRAVENOUS at 10:02

## 2021-02-11 RX ADMIN — SODIUM CHLORIDE: 0.9 INJECTION, SOLUTION INTRAVENOUS at 08:02

## 2021-02-11 RX ADMIN — PROPOFOL 80 MG: 10 INJECTION, EMULSION INTRAVENOUS at 10:02

## 2021-02-12 ENCOUNTER — SPECIALTY PHARMACY (OUTPATIENT)
Dept: PHARMACY | Facility: CLINIC | Age: 56
End: 2021-02-12

## 2021-02-15 ENCOUNTER — TELEPHONE (OUTPATIENT)
Dept: PHARMACY | Facility: CLINIC | Age: 56
End: 2021-02-15

## 2021-03-09 ENCOUNTER — SPECIALTY PHARMACY (OUTPATIENT)
Dept: PHARMACY | Facility: CLINIC | Age: 56
End: 2021-03-09

## 2021-05-11 ENCOUNTER — TELEPHONE (OUTPATIENT)
Dept: NEUROLOGY | Facility: HOSPITAL | Age: 56
End: 2021-05-11

## 2021-05-31 ENCOUNTER — TELEPHONE (OUTPATIENT)
Dept: NEUROLOGY | Facility: HOSPITAL | Age: 56
End: 2021-05-31

## 2021-06-02 ENCOUNTER — LAB VISIT (OUTPATIENT)
Dept: LAB | Facility: HOSPITAL | Age: 56
End: 2021-06-02
Attending: INTERNAL MEDICINE
Payer: MEDICARE

## 2021-06-02 ENCOUNTER — OFFICE VISIT (OUTPATIENT)
Dept: NEUROLOGY | Facility: HOSPITAL | Age: 56
End: 2021-06-02
Attending: INTERNAL MEDICINE
Payer: MEDICARE

## 2021-06-02 VITALS
HEART RATE: 79 BPM | WEIGHT: 171.63 LBS | TEMPERATURE: 97 F | DIASTOLIC BLOOD PRESSURE: 83 MMHG | BODY MASS INDEX: 30.41 KG/M2 | SYSTOLIC BLOOD PRESSURE: 154 MMHG | HEIGHT: 63 IN | RESPIRATION RATE: 22 BRPM

## 2021-06-02 DIAGNOSIS — Z86.010 HISTORY OF COLON POLYPS: ICD-10-CM

## 2021-06-02 DIAGNOSIS — B18.2 CHRONIC HEPATITIS C WITHOUT HEPATIC COMA: Primary | ICD-10-CM

## 2021-06-02 DIAGNOSIS — B18.2 CHRONIC HEPATITIS C WITHOUT HEPATIC COMA: ICD-10-CM

## 2021-06-02 LAB
ALBUMIN SERPL BCP-MCNC: 3.4 G/DL (ref 3.5–5.2)
ALP SERPL-CCNC: 82 U/L (ref 55–135)
ALT SERPL W/O P-5'-P-CCNC: 5 U/L (ref 10–44)
ANION GAP SERPL CALC-SCNC: 8 MMOL/L (ref 8–16)
AST SERPL-CCNC: 13 U/L (ref 10–40)
BASOPHILS # BLD AUTO: 0.02 K/UL (ref 0–0.2)
BASOPHILS NFR BLD: 0.3 % (ref 0–1.9)
BILIRUB SERPL-MCNC: 0.2 MG/DL (ref 0.1–1)
BUN SERPL-MCNC: 13 MG/DL (ref 6–20)
CALCIUM SERPL-MCNC: 9.1 MG/DL (ref 8.7–10.5)
CHLORIDE SERPL-SCNC: 106 MMOL/L (ref 95–110)
CO2 SERPL-SCNC: 25 MMOL/L (ref 23–29)
CREAT SERPL-MCNC: 0.5 MG/DL (ref 0.5–1.4)
DIFFERENTIAL METHOD: ABNORMAL
EOSINOPHIL # BLD AUTO: 0.2 K/UL (ref 0–0.5)
EOSINOPHIL NFR BLD: 2.2 % (ref 0–8)
ERYTHROCYTE [DISTWIDTH] IN BLOOD BY AUTOMATED COUNT: 12.4 % (ref 11.5–14.5)
EST. GFR  (AFRICAN AMERICAN): >60 ML/MIN/1.73 M^2
EST. GFR  (NON AFRICAN AMERICAN): >60 ML/MIN/1.73 M^2
GLUCOSE SERPL-MCNC: 101 MG/DL (ref 70–110)
HCT VFR BLD AUTO: 41.7 % (ref 37–48.5)
HGB BLD-MCNC: 13.6 G/DL (ref 12–16)
IMM GRANULOCYTES # BLD AUTO: 0.01 K/UL (ref 0–0.04)
IMM GRANULOCYTES NFR BLD AUTO: 0.1 % (ref 0–0.5)
INR PPP: 1 (ref 0.8–1.2)
LYMPHOCYTES # BLD AUTO: 3.3 K/UL (ref 1–4.8)
LYMPHOCYTES NFR BLD: 45.8 % (ref 18–48)
MCH RBC QN AUTO: 28.6 PG (ref 27–31)
MCHC RBC AUTO-ENTMCNC: 32.6 G/DL (ref 32–36)
MCV RBC AUTO: 88 FL (ref 82–98)
MONOCYTES # BLD AUTO: 0.5 K/UL (ref 0.3–1)
MONOCYTES NFR BLD: 6.3 % (ref 4–15)
NEUTROPHILS # BLD AUTO: 3.2 K/UL (ref 1.8–7.7)
NEUTROPHILS NFR BLD: 45.3 % (ref 38–73)
NRBC BLD-RTO: 0 /100 WBC
PLATELET # BLD AUTO: 272 K/UL (ref 150–450)
PMV BLD AUTO: 8.8 FL (ref 9.2–12.9)
POTASSIUM SERPL-SCNC: 4.2 MMOL/L (ref 3.5–5.1)
PROT SERPL-MCNC: 7 G/DL (ref 6–8.4)
PROTHROMBIN TIME: 10.7 SEC (ref 9–12.5)
RBC # BLD AUTO: 4.76 M/UL (ref 4–5.4)
SODIUM SERPL-SCNC: 139 MMOL/L (ref 136–145)
WBC # BLD AUTO: 7.16 K/UL (ref 3.9–12.7)

## 2021-06-02 PROCEDURE — 99214 OFFICE O/P EST MOD 30 MIN: CPT | Performed by: INTERNAL MEDICINE

## 2021-06-02 PROCEDURE — 85610 PROTHROMBIN TIME: CPT | Performed by: INTERNAL MEDICINE

## 2021-06-02 PROCEDURE — 36415 COLL VENOUS BLD VENIPUNCTURE: CPT | Performed by: INTERNAL MEDICINE

## 2021-06-02 PROCEDURE — 87522 HEPATITIS C REVRS TRNSCRPJ: CPT | Performed by: INTERNAL MEDICINE

## 2021-06-02 PROCEDURE — 85025 COMPLETE CBC W/AUTO DIFF WBC: CPT | Performed by: INTERNAL MEDICINE

## 2021-06-02 PROCEDURE — 80053 COMPREHEN METABOLIC PANEL: CPT | Performed by: INTERNAL MEDICINE

## 2021-06-02 RX ORDER — GABAPENTIN 300 MG/1
300 CAPSULE ORAL 3 TIMES DAILY
COMMUNITY
Start: 2021-05-24

## 2021-06-02 RX ORDER — TIOTROPIUM BROMIDE AND OLODATEROL 3.124; 2.736 UG/1; UG/1
SPRAY, METERED RESPIRATORY (INHALATION) DAILY PRN
COMMUNITY
Start: 2021-05-24

## 2021-06-02 RX ORDER — LAMOTRIGINE 25 MG/1
25 TABLET ORAL 2 TIMES DAILY
COMMUNITY
Start: 2021-05-24

## 2021-06-02 RX ORDER — HYDROXYZINE PAMOATE 25 MG/1
25 CAPSULE ORAL DAILY
COMMUNITY
Start: 2021-05-24

## 2021-06-02 RX ORDER — METHIMAZOLE 5 MG/1
5 TABLET ORAL DAILY
COMMUNITY
Start: 2021-05-24

## 2021-06-02 RX ORDER — MIRABEGRON 50 MG/1
1 TABLET, FILM COATED, EXTENDED RELEASE ORAL DAILY
COMMUNITY
Start: 2021-05-24

## 2021-06-02 RX ORDER — PREDNISOLONE ACETATE 10 MG/ML
SUSPENSION/ DROPS OPHTHALMIC 3 TIMES DAILY
COMMUNITY
Start: 2021-01-06

## 2021-06-03 LAB — HEPATITIS C VIRUS (HCV) RNA DETECTION/QUANTIFICATION RT-PCR: NORMAL IU/ML

## 2021-07-06 ENCOUNTER — TELEPHONE (OUTPATIENT)
Dept: NEUROLOGY | Facility: HOSPITAL | Age: 56
End: 2021-07-06

## 2021-07-07 ENCOUNTER — TELEPHONE (OUTPATIENT)
Dept: NEUROLOGY | Facility: HOSPITAL | Age: 56
End: 2021-07-07

## 2021-07-19 ENCOUNTER — OFFICE VISIT (OUTPATIENT)
Dept: NEUROLOGY | Facility: HOSPITAL | Age: 56
End: 2021-07-19
Attending: INTERNAL MEDICINE
Payer: MEDICARE

## 2021-07-19 ENCOUNTER — TELEPHONE (OUTPATIENT)
Dept: NEUROLOGY | Facility: HOSPITAL | Age: 56
End: 2021-07-19

## 2021-07-19 VITALS
WEIGHT: 168.44 LBS | DIASTOLIC BLOOD PRESSURE: 83 MMHG | SYSTOLIC BLOOD PRESSURE: 156 MMHG | TEMPERATURE: 99 F | HEART RATE: 93 BPM | BODY MASS INDEX: 29.84 KG/M2 | HEIGHT: 63 IN

## 2021-07-19 DIAGNOSIS — N82.3 RECTOVAGINAL FISTULA: Primary | ICD-10-CM

## 2021-07-19 PROCEDURE — 99214 OFFICE O/P EST MOD 30 MIN: CPT | Performed by: INTERNAL MEDICINE

## 2021-07-19 RX ORDER — LISINOPRIL 5 MG/1
5 TABLET ORAL
COMMUNITY

## 2021-07-23 ENCOUNTER — TELEPHONE (OUTPATIENT)
Dept: ADMINISTRATIVE | Facility: OTHER | Age: 56
End: 2021-07-23

## (undated) DEVICE — SYR SLIP TIP 1CC

## (undated) DEVICE — GLOVE BIOGEL SKINSENSE PI 6.0

## (undated) DEVICE — SOL BETADINE 5%

## (undated) DEVICE — CANNULA ANTERIOR CHAMBER 30G

## (undated) DEVICE — CASSETTE INFINITI

## (undated) DEVICE — TIP PHACO OZIL-12 0 .9MM

## (undated) DEVICE — GLOVE BIOGEL SKINSENSE PI 6.5

## (undated) DEVICE — Device

## (undated) DEVICE — CANNULA NUCLEUS HYRDODISECTOR